# Patient Record
Sex: FEMALE | Race: WHITE | Employment: FULL TIME | URBAN - METROPOLITAN AREA
[De-identification: names, ages, dates, MRNs, and addresses within clinical notes are randomized per-mention and may not be internally consistent; named-entity substitution may affect disease eponyms.]

---

## 2023-08-10 ENCOUNTER — HOSPITAL ENCOUNTER (OUTPATIENT)
Dept: DATA CONVERSION | Facility: HOSPITAL | Age: 38
Discharge: HOME | End: 2023-08-10

## 2023-08-10 DIAGNOSIS — O09.522 SUPERVISION OF ELDERLY MULTIGRAVIDA, SECOND TRIMESTER (HHS-HCC): ICD-10-CM

## 2023-08-10 LAB
DEPRECATED RDW RBC AUTO: 44.9 FL (ref 37–54)
ERYTHROCYTE [DISTWIDTH] IN BLOOD BY AUTOMATED COUNT: 12.2 % (ref 11.7–15)
GLUCOSE 1H P MEAL SERPL-MCNC: 100 MG/DL (ref 70–139)
HCT VFR BLD AUTO: 33.3 % (ref 36–44)
HGB BLD-MCNC: 10.8 GM/DL (ref 12–15)
MCH RBC QN AUTO: 32.3 PG (ref 26–34)
MCHC RBC AUTO-ENTMCNC: 32.4 % (ref 31–37)
MCV RBC AUTO: 99.7 FL (ref 80–100)
NRBC BLD-RTO: 0 /100 WBC
PLATELET # BLD AUTO: 187 K/UL (ref 150–450)
PMV BLD AUTO: 13.2 CU (ref 7–12.6)
RBC # BLD AUTO: 3.34 M/UL (ref 4–4.9)
WBC # BLD AUTO: 7.9 K/UL (ref 4.5–11)

## 2023-09-20 PROBLEM — O36.5930: Status: ACTIVE | Noted: 2023-09-20

## 2023-09-20 PROBLEM — N92.6 IRREGULAR MENSTRUAL CYCLE: Status: ACTIVE | Noted: 2023-09-20

## 2023-09-20 PROBLEM — N97.8: Status: ACTIVE | Noted: 2023-09-20

## 2023-09-20 RX ORDER — MILK THISTLE 150 MG
CAPSULE ORAL
COMMUNITY
Start: 2019-08-12 | End: 2023-10-11 | Stop reason: ALTCHOICE

## 2023-09-20 RX ORDER — PNV NO.95/FERROUS FUM/FOLIC AC 28MG-0.8MG
1 TABLET ORAL
COMMUNITY
Start: 2019-08-12 | End: 2023-12-11 | Stop reason: ALTCHOICE

## 2023-09-20 RX ORDER — CHOLECALCIFEROL (VITAMIN D3) 25 MCG
TABLET ORAL
COMMUNITY
Start: 2019-08-12 | End: 2023-12-11 | Stop reason: ALTCHOICE

## 2023-09-20 RX ORDER — CITALOPRAM 40 MG/1
2 TABLET, FILM COATED ORAL
COMMUNITY

## 2023-09-20 RX ORDER — IBUPROFEN 200 MG
3 TABLET ORAL EVERY 6 HOURS PRN
COMMUNITY
End: 2023-10-11 | Stop reason: ALTCHOICE

## 2023-10-05 ENCOUNTER — ROUTINE PRENATAL (OUTPATIENT)
Dept: OBSTETRICS AND GYNECOLOGY | Facility: CLINIC | Age: 38
End: 2023-10-05
Payer: COMMERCIAL

## 2023-10-05 VITALS — SYSTOLIC BLOOD PRESSURE: 104 MMHG | BODY MASS INDEX: 29.76 KG/M2 | WEIGHT: 173.4 LBS | DIASTOLIC BLOOD PRESSURE: 62 MMHG

## 2023-10-05 DIAGNOSIS — O09.523 MULTIGRAVIDA OF ADVANCED MATERNAL AGE IN THIRD TRIMESTER (HHS-HCC): Primary | ICD-10-CM

## 2023-10-05 LAB
POC APPEARANCE, URINE: CLEAR
POC BILIRUBIN, URINE: NEGATIVE
POC BLOOD, URINE: NEGATIVE
POC COLOR, URINE: YELLOW
POC GLUCOSE, URINE: NEGATIVE MG/DL
POC KETONES, URINE: ABNORMAL MG/DL
POC LEUKOCYTES, URINE: NEGATIVE
POC NITRITE,URINE: NEGATIVE
POC PH, URINE: 7 PH
POC PROTEIN, URINE: NEGATIVE MG/DL
POC SPECIFIC GRAVITY, URINE: 1.02
POC UROBILINOGEN, URINE: 0.2 EU/DL

## 2023-10-05 PROCEDURE — 0501F PRENATAL FLOW SHEET: CPT | Performed by: OBSTETRICS & GYNECOLOGY

## 2023-10-05 PROCEDURE — 81003 URINALYSIS AUTO W/O SCOPE: CPT | Mod: QW | Performed by: OBSTETRICS & GYNECOLOGY

## 2023-10-05 PROCEDURE — 87081 CULTURE SCREEN ONLY: CPT | Performed by: OBSTETRICS & GYNECOLOGY

## 2023-10-05 PROCEDURE — 99213 OFFICE O/P EST LOW 20 MIN: CPT | Performed by: OBSTETRICS & GYNECOLOGY

## 2023-10-05 ASSESSMENT — PATIENT HEALTH QUESTIONNAIRE - PHQ9
2. FEELING DOWN, DEPRESSED OR HOPELESS: NOT AT ALL
SUM OF ALL RESPONSES TO PHQ9 QUESTIONS 1 & 2: 0
1. LITTLE INTEREST OR PLEASURE IN DOING THINGS: NOT AT ALL

## 2023-10-08 LAB — GP B STREP GENITAL QL CULT: NORMAL

## 2023-10-11 ENCOUNTER — ROUTINE PRENATAL (OUTPATIENT)
Dept: OBSTETRICS AND GYNECOLOGY | Facility: CLINIC | Age: 38
End: 2023-10-11
Payer: COMMERCIAL

## 2023-10-11 VITALS — SYSTOLIC BLOOD PRESSURE: 100 MMHG | BODY MASS INDEX: 30.28 KG/M2 | WEIGHT: 176.4 LBS | DIASTOLIC BLOOD PRESSURE: 62 MMHG

## 2023-10-11 DIAGNOSIS — O09.523 MULTIGRAVIDA OF ADVANCED MATERNAL AGE IN THIRD TRIMESTER (HHS-HCC): Primary | ICD-10-CM

## 2023-10-11 DIAGNOSIS — Z3A.36 36 WEEKS GESTATION OF PREGNANCY (HHS-HCC): ICD-10-CM

## 2023-10-11 LAB
POC APPEARANCE, URINE: CLEAR
POC BILIRUBIN, URINE: NEGATIVE
POC BLOOD, URINE: NEGATIVE
POC COLOR, URINE: YELLOW
POC GLUCOSE, URINE: NEGATIVE MG/DL
POC KETONES, URINE: NEGATIVE MG/DL
POC LEUKOCYTES, URINE: ABNORMAL
POC NITRITE,URINE: NEGATIVE
POC PH, URINE: 7 PH
POC PROTEIN, URINE: NEGATIVE MG/DL
POC SPECIFIC GRAVITY, URINE: 1.01
POC UROBILINOGEN, URINE: 0.2 EU/DL

## 2023-10-11 PROCEDURE — 99212 OFFICE O/P EST SF 10 MIN: CPT | Performed by: OBSTETRICS & GYNECOLOGY

## 2023-10-11 PROCEDURE — 81003 URINALYSIS AUTO W/O SCOPE: CPT | Mod: QW | Performed by: OBSTETRICS & GYNECOLOGY

## 2023-10-11 PROCEDURE — 0501F PRENATAL FLOW SHEET: CPT | Performed by: OBSTETRICS & GYNECOLOGY

## 2023-10-11 ASSESSMENT — PATIENT HEALTH QUESTIONNAIRE - PHQ9
1. LITTLE INTEREST OR PLEASURE IN DOING THINGS: NOT AT ALL
2. FEELING DOWN, DEPRESSED OR HOPELESS: NOT AT ALL
SUM OF ALL RESPONSES TO PHQ9 QUESTIONS 1 & 2: 0

## 2023-10-11 NOTE — PROGRESS NOTES
Subjective   Patient ID [id number]   Genna Garner is a 38 y.o.  at 36w4d with a working estimated date of delivery of 2023, by Last Menstrual Period who presents for a routine prenatal visit. She denies vaginal bleeding, leakage of fluid, decreased fetal movements, or contractions.    Her pregnancy is complicated by:  AMA    Objective   Physical Exam:   Weight: 80 kg (176 lb 6.4 oz)  Expected Total Weight Gain: Could not be calculated   Pregravid BMI: Could not be calculated  BP: 100/62  Fetal Heart Rate: reactive NST   Presentation: Vertex  Dilation: 2 Effacement (%): 80 Fetal Station: -1    Prenatal Labs  Urine Dip:  Lab Results   Component Value Date    KETONESU NEGATIVE 10/11/2023    PROTUR NEGATIVE 10/11/2023    GLUCOSEU NEGATIVE 10/11/2023    NITRITEU NEGATIVE 10/11/2023         Imaging  Last ultrasound good growth, vtx    Assessment/Plan   Diagnoses and all orders for this visit:  Multigravida of advanced maternal age in third trimester  -     POCT UA Automated manually resulted  -     Fetal nonstress test, once; Future  36 weeks gestation of pregnancy      Continue prenatal vitamin.  GBS neg  NST reactive  Labor precautions  Expected mode of delivery   Follow up in 1 week for a routine prenatal visit.  Maya Johnson MD

## 2023-10-19 ENCOUNTER — ROUTINE PRENATAL (OUTPATIENT)
Dept: OBSTETRICS AND GYNECOLOGY | Facility: CLINIC | Age: 38
End: 2023-10-19
Payer: COMMERCIAL

## 2023-10-19 VITALS — SYSTOLIC BLOOD PRESSURE: 106 MMHG | DIASTOLIC BLOOD PRESSURE: 66 MMHG | BODY MASS INDEX: 30.28 KG/M2 | WEIGHT: 176.4 LBS

## 2023-10-19 DIAGNOSIS — O09.523 MULTIGRAVIDA OF ADVANCED MATERNAL AGE IN THIRD TRIMESTER (HHS-HCC): Primary | ICD-10-CM

## 2023-10-19 DIAGNOSIS — Z3A.37 37 WEEKS GESTATION OF PREGNANCY (HHS-HCC): ICD-10-CM

## 2023-10-19 LAB
POC APPEARANCE, URINE: CLEAR
POC BILIRUBIN, URINE: NEGATIVE
POC BLOOD, URINE: NEGATIVE
POC COLOR, URINE: YELLOW
POC GLUCOSE, URINE: NEGATIVE MG/DL
POC KETONES, URINE: ABNORMAL MG/DL
POC LEUKOCYTES, URINE: ABNORMAL
POC NITRITE,URINE: NEGATIVE
POC PH, URINE: 6.5 PH
POC PROTEIN, URINE: NEGATIVE MG/DL
POC SPECIFIC GRAVITY, URINE: 1.02
POC UROBILINOGEN, URINE: 0.2 EU/DL

## 2023-10-19 PROCEDURE — 99212 OFFICE O/P EST SF 10 MIN: CPT | Performed by: OBSTETRICS & GYNECOLOGY

## 2023-10-19 PROCEDURE — 0501F PRENATAL FLOW SHEET: CPT | Performed by: OBSTETRICS & GYNECOLOGY

## 2023-10-19 PROCEDURE — 81003 URINALYSIS AUTO W/O SCOPE: CPT | Mod: QW | Performed by: OBSTETRICS & GYNECOLOGY

## 2023-10-19 ASSESSMENT — PATIENT HEALTH QUESTIONNAIRE - PHQ9
SUM OF ALL RESPONSES TO PHQ9 QUESTIONS 1 & 2: 0
2. FEELING DOWN, DEPRESSED OR HOPELESS: NOT AT ALL
1. LITTLE INTEREST OR PLEASURE IN DOING THINGS: NOT AT ALL

## 2023-10-19 NOTE — PROGRESS NOTES
Subjective   Patient ID [id number]   Genna Garner is a 38 y.o.  at 37w5d with a working estimated date of delivery of 2023, by Last Menstrual Period who presents for a routine prenatal visit. She denies vaginal bleeding, leakage of fluid, decreased fetal movements, or contractions.    Her pregnancy is complicated by:  AMA    Objective   Physical Exam:   Weight: 80 kg (176 lb 6.4 oz)  Expected Total Weight Gain: Could not be calculated   Pregravid BMI: Could not be calculated  BP: 106/66  Fetal Heart Rate: reactive NST Fundal Height (cm): 35 cm Presentation: Vertex  Dilation: 2 Effacement (%): 90 Fetal Station: 0    Prenatal Labs  Urine Dip  Results for orders placed or performed in visit on 10/19/23   POCT UA Automated manually resulted   Result Value Ref Range    POC Color, Urine Yellow Straw, Yellow, Light-Yellow    POC Appearance, Urine Clear Clear    POC Specific Gravity, Urine 1.025 1.005 - 1.035    POC PH, Urine 6.5 No Reference Range Established PH    POC Protein, Urine NEGATIVE NEGATIVE, 30 (1+) mg/dl    POC Glucose, Urine NEGATIVE NEGATIVE mg/dl    POC Blood, Urine NEGATIVE NEGATIVE    POC Ketones, Urine TRACE (A) NEGATIVE mg/dl    POC Bilirubin, Urine NEGATIVE NEGATIVE    POC Urobilinogen, Urine 0.2 0.2, 1.0 EU/DL    Poc Nitrate, Urine NEGATIVE NEGATIVE    POC Leukocytes, Urine SMALL (1+) (A) NEGATIVE         Imaging  Last ultrasound nl growth    Assessment/Plan   Diagnoses and all orders for this visit:  Multigravida of advanced maternal age in third trimester  -     POCT UA Automated manually resulted  -     Fetal nonstress test, once  37 weeks gestation of pregnancy      Continue prenatal vitamin.  GBS neg  Labor precautions  Induction sched for 10/30  Follow up in 1 week for a routine prenatal visit.    Maya Johnson MD

## 2023-10-24 NOTE — PROGRESS NOTES
Subjective   Patient ID [id number]   Genna Garner is a 38 y.o.  at 38w5d with a working estimated date of delivery of 2023, by Last Menstrual Period who presents for a routine prenatal visit. She denies vaginal bleeding, leakage of fluid, decreased fetal movements, or contractions.    Her pregnancy is complicated by:  AMA    Objective   Physical Exam:   Weight: 80.3 kg (177 lb)  Expected Total Weight Gain: Could not be calculated   Pregravid BMI: Could not be calculated  BP: 119/74  Fetal Heart Rate: reactive nst   Presentation: Vertex  Dilation: 2 Effacement (%): 90 Fetal Station: 0    Prenatal Labs  Urine Dip  Results for orders placed or performed in visit on 10/26/23   POCT UA Automated manually resulted   Result Value Ref Range    POC Color, Urine Yellow Straw, Yellow, Light-Yellow    POC Appearance, Urine Clear Clear    POC Specific Gravity, Urine 1.020 1.005 - 1.035    POC PH, Urine 7.0 No Reference Range Established PH    POC Protein, Urine NEGATIVE NEGATIVE, 30 (1+) mg/dl    POC Glucose, Urine NEGATIVE NEGATIVE mg/dl    POC Blood, Urine NEGATIVE NEGATIVE    POC Ketones, Urine NEGATIVE NEGATIVE mg/dl    POC Bilirubin, Urine NEGATIVE NEGATIVE    POC Urobilinogen, Urine 0.2 0.2, 1.0 EU/DL    Poc Nitrate, Urine NEGATIVE NEGATIVE    POC Leukocytes, Urine TRACE (A) NEGATIVE         Imaging      Assessment/Plan   Problem List Items Addressed This Visit    None  Visit Diagnoses         Codes    Supervision of other normal pregnancy, antepartum    -  Primary Z34.80    Relevant Orders    POCT UA Automated manually resulted (Completed)    38 weeks gestation of pregnancy     Z3A.38    Multigravida of advanced maternal age in third trimester     O09.523    Relevant Orders    Fetal nonstress test, once            Continue prenatal vitamin.  Labs reviewed.  GBS neg.  Expected mode of delivery IOL 10/29  Follow up pp.

## 2023-10-26 ENCOUNTER — ROUTINE PRENATAL (OUTPATIENT)
Dept: OBSTETRICS AND GYNECOLOGY | Facility: CLINIC | Age: 38
End: 2023-10-26
Payer: COMMERCIAL

## 2023-10-26 ENCOUNTER — APPOINTMENT (OUTPATIENT)
Dept: PRIMARY CARE | Facility: CLINIC | Age: 38
End: 2023-10-26
Payer: COMMERCIAL

## 2023-10-26 VITALS — SYSTOLIC BLOOD PRESSURE: 119 MMHG | BODY MASS INDEX: 30.38 KG/M2 | WEIGHT: 177 LBS | DIASTOLIC BLOOD PRESSURE: 74 MMHG

## 2023-10-26 DIAGNOSIS — Z34.80 SUPERVISION OF OTHER NORMAL PREGNANCY, ANTEPARTUM (HHS-HCC): Primary | ICD-10-CM

## 2023-10-26 DIAGNOSIS — Z3A.38 38 WEEKS GESTATION OF PREGNANCY (HHS-HCC): ICD-10-CM

## 2023-10-26 DIAGNOSIS — O09.523 MULTIGRAVIDA OF ADVANCED MATERNAL AGE IN THIRD TRIMESTER (HHS-HCC): ICD-10-CM

## 2023-10-26 LAB
POC APPEARANCE, URINE: CLEAR
POC BILIRUBIN, URINE: NEGATIVE
POC BLOOD, URINE: NEGATIVE
POC COLOR, URINE: YELLOW
POC GLUCOSE, URINE: NEGATIVE MG/DL
POC KETONES, URINE: NEGATIVE MG/DL
POC LEUKOCYTES, URINE: ABNORMAL
POC NITRITE,URINE: NEGATIVE
POC PH, URINE: 7 PH
POC PROTEIN, URINE: NEGATIVE MG/DL
POC SPECIFIC GRAVITY, URINE: 1.02
POC UROBILINOGEN, URINE: 0.2 EU/DL

## 2023-10-26 PROCEDURE — 99213 OFFICE O/P EST LOW 20 MIN: CPT | Performed by: OBSTETRICS & GYNECOLOGY

## 2023-10-26 PROCEDURE — 0501F PRENATAL FLOW SHEET: CPT | Performed by: OBSTETRICS & GYNECOLOGY

## 2023-10-26 PROCEDURE — 81003 URINALYSIS AUTO W/O SCOPE: CPT | Mod: QW | Performed by: OBSTETRICS & GYNECOLOGY

## 2023-10-26 ASSESSMENT — LIFESTYLE VARIABLES
AUDIT-C TOTAL SCORE: 0
SKIP TO QUESTIONS 9-10: 1
HOW OFTEN DO YOU HAVE A DRINK CONTAINING ALCOHOL: NEVER
HOW MANY STANDARD DRINKS CONTAINING ALCOHOL DO YOU HAVE ON A TYPICAL DAY: 1 OR 2
HOW OFTEN DO YOU HAVE SIX OR MORE DRINKS ON ONE OCCASION: NEVER

## 2023-10-29 ENCOUNTER — APPOINTMENT (OUTPATIENT)
Dept: OBSTETRICS AND GYNECOLOGY | Facility: HOSPITAL | Age: 38
End: 2023-10-29
Payer: COMMERCIAL

## 2023-10-30 ENCOUNTER — ANESTHESIA (OUTPATIENT)
Dept: OBSTETRICS AND GYNECOLOGY | Facility: HOSPITAL | Age: 38
End: 2023-10-30
Payer: COMMERCIAL

## 2023-10-30 ENCOUNTER — APPOINTMENT (OUTPATIENT)
Dept: OBSTETRICS AND GYNECOLOGY | Facility: HOSPITAL | Age: 38
End: 2023-10-30
Payer: COMMERCIAL

## 2023-10-30 ENCOUNTER — HOSPITAL ENCOUNTER (INPATIENT)
Facility: HOSPITAL | Age: 38
LOS: 1 days | Discharge: ANOTHER HOSPITAL | End: 2023-10-31
Attending: OBSTETRICS & GYNECOLOGY | Admitting: OBSTETRICS & GYNECOLOGY
Payer: COMMERCIAL

## 2023-10-30 DIAGNOSIS — O09.523 MULTIGRAVIDA OF ADVANCED MATERNAL AGE IN THIRD TRIMESTER (HHS-HCC): ICD-10-CM

## 2023-10-30 DIAGNOSIS — Z3A.37 37 WEEKS GESTATION OF PREGNANCY (HHS-HCC): ICD-10-CM

## 2023-10-30 LAB
ABO GROUP (TYPE) IN BLOOD: NORMAL
ANTIBODY SCREEN: NORMAL
ERYTHROCYTE [DISTWIDTH] IN BLOOD BY AUTOMATED COUNT: 13.3 % (ref 11.5–14.5)
HCT VFR BLD AUTO: 32.1 % (ref 36–46)
HGB BLD-MCNC: 11 G/DL (ref 12–16)
MCH RBC QN AUTO: 32.2 PG (ref 26–34)
MCHC RBC AUTO-ENTMCNC: 34.3 G/DL (ref 32–36)
MCV RBC AUTO: 94 FL (ref 80–100)
NRBC BLD-RTO: 0 /100 WBCS (ref 0–0)
PLATELET # BLD AUTO: 140 X10*3/UL (ref 150–450)
PMV BLD AUTO: 13 FL (ref 7.5–11.5)
RBC # BLD AUTO: 3.42 X10*6/UL (ref 4–5.2)
RH FACTOR (ANTIGEN D): NORMAL
WBC # BLD AUTO: 9.5 X10*3/UL (ref 4.4–11.3)

## 2023-10-30 PROCEDURE — 1120000001 HC OB PRIVATE ROOM DAILY

## 2023-10-30 PROCEDURE — 86780 TREPONEMA PALLIDUM: CPT | Mod: TRILAB | Performed by: OBSTETRICS & GYNECOLOGY

## 2023-10-30 PROCEDURE — 85027 COMPLETE CBC AUTOMATED: CPT | Performed by: OBSTETRICS & GYNECOLOGY

## 2023-10-30 PROCEDURE — 2500000004 HC RX 250 GENERAL PHARMACY W/ HCPCS (ALT 636 FOR OP/ED): Performed by: OBSTETRICS & GYNECOLOGY

## 2023-10-30 PROCEDURE — 86901 BLOOD TYPING SEROLOGIC RH(D): CPT | Performed by: OBSTETRICS & GYNECOLOGY

## 2023-10-30 PROCEDURE — 36415 COLL VENOUS BLD VENIPUNCTURE: CPT | Performed by: OBSTETRICS & GYNECOLOGY

## 2023-10-30 RX ORDER — SODIUM CHLORIDE, SODIUM LACTATE, POTASSIUM CHLORIDE, CALCIUM CHLORIDE 600; 310; 30; 20 MG/100ML; MG/100ML; MG/100ML; MG/100ML
125 INJECTION, SOLUTION INTRAVENOUS CONTINUOUS
Status: DISCONTINUED | OUTPATIENT
Start: 2023-10-30 | End: 2023-10-31 | Stop reason: HOSPADM

## 2023-10-30 RX ORDER — OXYTOCIN/0.9 % SODIUM CHLORIDE 30/500 ML
2-30 PLASTIC BAG, INJECTION (ML) INTRAVENOUS CONTINUOUS
Status: DISCONTINUED | OUTPATIENT
Start: 2023-10-30 | End: 2023-10-31 | Stop reason: HOSPADM

## 2023-10-30 RX ORDER — HYDRALAZINE HYDROCHLORIDE 20 MG/ML
5 INJECTION INTRAMUSCULAR; INTRAVENOUS ONCE AS NEEDED
Status: DISCONTINUED | OUTPATIENT
Start: 2023-10-30 | End: 2023-10-31 | Stop reason: HOSPADM

## 2023-10-30 RX ORDER — CARBOPROST TROMETHAMINE 250 UG/ML
250 INJECTION, SOLUTION INTRAMUSCULAR ONCE AS NEEDED
Status: DISCONTINUED | OUTPATIENT
Start: 2023-10-30 | End: 2023-10-31 | Stop reason: HOSPADM

## 2023-10-30 RX ORDER — METHYLERGONOVINE MALEATE 0.2 MG/ML
0.2 INJECTION INTRAVENOUS ONCE AS NEEDED
Status: DISCONTINUED | OUTPATIENT
Start: 2023-10-30 | End: 2023-10-31 | Stop reason: HOSPADM

## 2023-10-30 RX ORDER — NIFEDIPINE 10 MG/1
10 CAPSULE ORAL ONCE AS NEEDED
Status: DISCONTINUED | OUTPATIENT
Start: 2023-10-30 | End: 2023-10-31 | Stop reason: HOSPADM

## 2023-10-30 RX ORDER — LOPERAMIDE HYDROCHLORIDE 2 MG/1
4 CAPSULE ORAL EVERY 2 HOUR PRN
Status: DISCONTINUED | OUTPATIENT
Start: 2023-10-30 | End: 2023-10-31 | Stop reason: HOSPADM

## 2023-10-30 RX ORDER — TRANEXAMIC ACID 100 MG/ML
1000 INJECTION, SOLUTION INTRAVENOUS ONCE AS NEEDED
Status: DISCONTINUED | OUTPATIENT
Start: 2023-10-30 | End: 2023-10-31 | Stop reason: HOSPADM

## 2023-10-30 RX ORDER — MISOPROSTOL 200 UG/1
800 TABLET ORAL ONCE AS NEEDED
Status: DISCONTINUED | OUTPATIENT
Start: 2023-10-30 | End: 2023-10-31 | Stop reason: HOSPADM

## 2023-10-30 RX ORDER — ONDANSETRON HYDROCHLORIDE 2 MG/ML
4 INJECTION, SOLUTION INTRAVENOUS EVERY 6 HOURS PRN
Status: DISCONTINUED | OUTPATIENT
Start: 2023-10-30 | End: 2023-10-31 | Stop reason: HOSPADM

## 2023-10-30 RX ORDER — MISOPROSTOL 100 UG/1
25 TABLET ORAL
Status: DISCONTINUED | OUTPATIENT
Start: 2023-10-30 | End: 2023-10-30

## 2023-10-30 RX ORDER — TERBUTALINE SULFATE 1 MG/ML
0.25 INJECTION SUBCUTANEOUS ONCE AS NEEDED
Status: DISCONTINUED | OUTPATIENT
Start: 2023-10-30 | End: 2023-10-31 | Stop reason: HOSPADM

## 2023-10-30 RX ORDER — LIDOCAINE HYDROCHLORIDE 10 MG/ML
30 INJECTION INFILTRATION; PERINEURAL ONCE AS NEEDED
Status: DISCONTINUED | OUTPATIENT
Start: 2023-10-30 | End: 2023-10-31 | Stop reason: HOSPADM

## 2023-10-30 RX ORDER — METOCLOPRAMIDE 10 MG/1
10 TABLET ORAL EVERY 6 HOURS PRN
Status: DISCONTINUED | OUTPATIENT
Start: 2023-10-30 | End: 2023-10-31 | Stop reason: HOSPADM

## 2023-10-30 RX ORDER — OXYTOCIN 10 [USP'U]/ML
10 INJECTION, SOLUTION INTRAMUSCULAR; INTRAVENOUS ONCE AS NEEDED
Status: DISCONTINUED | OUTPATIENT
Start: 2023-10-30 | End: 2023-10-31 | Stop reason: HOSPADM

## 2023-10-30 RX ORDER — ONDANSETRON 4 MG/1
4 TABLET, FILM COATED ORAL EVERY 6 HOURS PRN
Status: DISCONTINUED | OUTPATIENT
Start: 2023-10-30 | End: 2023-10-31 | Stop reason: HOSPADM

## 2023-10-30 RX ORDER — METOCLOPRAMIDE HYDROCHLORIDE 5 MG/ML
10 INJECTION INTRAMUSCULAR; INTRAVENOUS EVERY 6 HOURS PRN
Status: DISCONTINUED | OUTPATIENT
Start: 2023-10-30 | End: 2023-10-31 | Stop reason: HOSPADM

## 2023-10-30 RX ORDER — LABETALOL HYDROCHLORIDE 5 MG/ML
20 INJECTION, SOLUTION INTRAVENOUS ONCE AS NEEDED
Status: DISCONTINUED | OUTPATIENT
Start: 2023-10-30 | End: 2023-10-31 | Stop reason: HOSPADM

## 2023-10-30 RX ORDER — OXYTOCIN/0.9 % SODIUM CHLORIDE 30/500 ML
60 PLASTIC BAG, INJECTION (ML) INTRAVENOUS ONCE AS NEEDED
Status: DISCONTINUED | OUTPATIENT
Start: 2023-10-30 | End: 2023-10-31 | Stop reason: HOSPADM

## 2023-10-30 RX ADMIN — SODIUM CHLORIDE, SODIUM LACTATE, POTASSIUM CHLORIDE, AND CALCIUM CHLORIDE 125 ML/HR: 600; 310; 30; 20 INJECTION, SOLUTION INTRAVENOUS at 22:00

## 2023-10-30 RX ADMIN — Medication 2 MILLI-UNITS/MIN: at 21:44

## 2023-10-30 RX ADMIN — SODIUM CHLORIDE, SODIUM LACTATE, POTASSIUM CHLORIDE, AND CALCIUM CHLORIDE 500 ML: 600; 310; 30; 20 INJECTION, SOLUTION INTRAVENOUS at 21:00

## 2023-10-30 SDOH — HEALTH STABILITY: MENTAL HEALTH
STRESS IS WHEN SOMEONE FEELS TENSE, NERVOUS, ANXIOUS, OR CAN'T SLEEP AT NIGHT BECAUSE THEIR MIND IS TROUBLED. HOW STRESSED ARE YOU?: NOT AT ALL

## 2023-10-30 SDOH — HEALTH STABILITY: MENTAL HEALTH: HOW OFTEN DO YOU HAVE 6 OR MORE DRINKS ON ONE OCCASION?: NEVER

## 2023-10-30 SDOH — SOCIAL STABILITY: SOCIAL NETWORK: HOW OFTEN DO YOU ATTENT MEETINGS OF THE CLUB OR ORGANIZATION YOU BELONG TO?: NEVER

## 2023-10-30 SDOH — HEALTH STABILITY: MENTAL HEALTH: SUICIDAL BEHAVIOR (LIFETIME): NO

## 2023-10-30 SDOH — SOCIAL STABILITY: SOCIAL NETWORK
DO YOU BELONG TO ANY CLUBS OR ORGANIZATIONS SUCH AS CHURCH GROUPS UNIONS, FRATERNAL OR ATHLETIC GROUPS, OR SCHOOL GROUPS?: NO

## 2023-10-30 SDOH — HEALTH STABILITY: MENTAL HEALTH: HOW OFTEN DO YOU HAVE A DRINK CONTAINING ALCOHOL?: NEVER

## 2023-10-30 SDOH — SOCIAL STABILITY: SOCIAL INSECURITY: WITHIN THE LAST YEAR, HAVE YOU BEEN HUMILIATED OR EMOTIONALLY ABUSED IN OTHER WAYS BY YOUR PARTNER OR EX-PARTNER?: NO

## 2023-10-30 SDOH — HEALTH STABILITY: PHYSICAL HEALTH: ON AVERAGE, HOW MANY DAYS PER WEEK DO YOU ENGAGE IN MODERATE TO STRENUOUS EXERCISE (LIKE A BRISK WALK)?: 3 DAYS

## 2023-10-30 SDOH — SOCIAL STABILITY: SOCIAL INSECURITY: ABUSE SCREEN: ADULT

## 2023-10-30 SDOH — SOCIAL STABILITY: SOCIAL NETWORK
IN A TYPICAL WEEK, HOW MANY TIMES DO YOU TALK ON THE PHONE WITH FAMILY, FRIENDS, OR NEIGHBORS?: MORE THAN THREE TIMES A WEEK

## 2023-10-30 SDOH — SOCIAL STABILITY: SOCIAL INSECURITY: ARE THERE ANY APPARENT SIGNS OF INJURIES/BEHAVIORS THAT COULD BE RELATED TO ABUSE/NEGLECT?: YES

## 2023-10-30 SDOH — HEALTH STABILITY: MENTAL HEALTH: STRENGTHS (MUST CHOOSE TWO): SUPPORT FROM FAMILY;SUPPORT FROM FRIENDS

## 2023-10-30 SDOH — SOCIAL STABILITY: SOCIAL INSECURITY
WITHIN THE LAST YEAR, HAVE YOU BEEN KICKED, HIT, SLAPPED, OR OTHERWISE PHYSICALLY HURT BY YOUR PARTNER OR EX-PARTNER?: NO

## 2023-10-30 SDOH — ECONOMIC STABILITY: TRANSPORTATION INSECURITY
IN THE PAST 12 MONTHS, HAS THE LACK OF TRANSPORTATION KEPT YOU FROM MEDICAL APPOINTMENTS OR FROM GETTING MEDICATIONS?: NO

## 2023-10-30 SDOH — SOCIAL STABILITY: SOCIAL INSECURITY: PHYSICAL ABUSE: DENIES

## 2023-10-30 SDOH — SOCIAL STABILITY: SOCIAL INSECURITY: WITHIN THE LAST YEAR, HAVE YOU BEEN AFRAID OF YOUR PARTNER OR EX-PARTNER?: NO

## 2023-10-30 SDOH — HEALTH STABILITY: PHYSICAL HEALTH: ON AVERAGE, HOW MANY MINUTES DO YOU ENGAGE IN EXERCISE AT THIS LEVEL?: 60 MIN

## 2023-10-30 SDOH — ECONOMIC STABILITY: FOOD INSECURITY: WITHIN THE PAST 12 MONTHS, THE FOOD YOU BOUGHT JUST DIDN'T LAST AND YOU DIDN'T HAVE MONEY TO GET MORE.: NEVER TRUE

## 2023-10-30 SDOH — HEALTH STABILITY: MENTAL HEALTH: NON-SPECIFIC ACTIVE SUICIDAL THOUGHTS (PAST 1 MONTH): NO

## 2023-10-30 SDOH — ECONOMIC STABILITY: HOUSING INSECURITY: DO YOU FEEL UNSAFE GOING BACK TO THE PLACE WHERE YOU ARE LIVING?: YES

## 2023-10-30 SDOH — HEALTH STABILITY: MENTAL HEALTH: WERE YOU ABLE TO COMPLETE ALL THE BEHAVIORAL HEALTH SCREENINGS?: YES

## 2023-10-30 SDOH — SOCIAL STABILITY: SOCIAL INSECURITY: VERBAL ABUSE: DENIES

## 2023-10-30 SDOH — SOCIAL STABILITY: SOCIAL INSECURITY
WITHIN THE LAST YEAR, HAVE TO BEEN RAPED OR FORCED TO HAVE ANY KIND OF SEXUAL ACTIVITY BY YOUR PARTNER OR EX-PARTNER?: NO

## 2023-10-30 SDOH — HEALTH STABILITY: MENTAL HEALTH: HOW MANY STANDARD DRINKS CONTAINING ALCOHOL DO YOU HAVE ON A TYPICAL DAY?: PATIENT DOES NOT DRINK

## 2023-10-30 SDOH — SOCIAL STABILITY: SOCIAL NETWORK: ARE YOU MARRIED, WIDOWED, DIVORCED, SEPARATED, NEVER MARRIED, OR LIVING WITH A PARTNER?: MARRIED

## 2023-10-30 SDOH — HEALTH STABILITY: MENTAL HEALTH: HAVE YOU USED ANY PRESCRIPTION DRUGS OTHER THAN PRESCRIBED IN THE PAST 12 MONTHS?: NO

## 2023-10-30 SDOH — ECONOMIC STABILITY: INCOME INSECURITY: HOW HARD IS IT FOR YOU TO PAY FOR THE VERY BASICS LIKE FOOD, HOUSING, MEDICAL CARE, AND HEATING?: NOT HARD AT ALL

## 2023-10-30 SDOH — SOCIAL STABILITY: SOCIAL INSECURITY: DOES ANYONE TRY TO KEEP YOU FROM HAVING/CONTACTING OTHER FRIENDS OR DOING THINGS OUTSIDE YOUR HOME?: YES

## 2023-10-30 SDOH — ECONOMIC STABILITY: FOOD INSECURITY: WITHIN THE PAST 12 MONTHS, YOU WORRIED THAT YOUR FOOD WOULD RUN OUT BEFORE YOU GOT MONEY TO BUY MORE.: NEVER TRUE

## 2023-10-30 SDOH — HEALTH STABILITY: MENTAL HEALTH: HAVE YOU USED ANY SUBSTANCES (CANABIS, COCAINE, HEROIN, HALLUCINOGENS, INHALANTS, ETC.) IN THE PAST 12 MONTHS?: NO

## 2023-10-30 SDOH — ECONOMIC STABILITY: TRANSPORTATION INSECURITY
IN THE PAST 12 MONTHS, HAS LACK OF TRANSPORTATION KEPT YOU FROM MEETINGS, WORK, OR FROM GETTING THINGS NEEDED FOR DAILY LIVING?: NO

## 2023-10-30 SDOH — SOCIAL STABILITY: SOCIAL INSECURITY: HAS ANYONE EVER THREATENED TO HURT YOUR FAMILY OR YOUR PETS?: YES

## 2023-10-30 SDOH — HEALTH STABILITY: MENTAL HEALTH: WISH TO BE DEAD (PAST 1 MONTH): NO

## 2023-10-30 SDOH — SOCIAL STABILITY: SOCIAL INSECURITY: DO YOU FEEL ANYONE HAS EXPLOITED OR TAKEN ADVANTAGE OF YOU FINANCIALLY OR OF YOUR PERSONAL PROPERTY?: YES

## 2023-10-30 SDOH — SOCIAL STABILITY: SOCIAL INSECURITY: ARE YOU OR HAVE YOU BEEN THREATENED OR ABUSED PHYSICALLY, EMOTIONALLY, OR SEXUALLY BY ANYONE?: YES

## 2023-10-30 SDOH — SOCIAL STABILITY: SOCIAL NETWORK: HOW OFTEN DO YOU GET TOGETHER WITH FRIENDS OR RELATIVES?: MORE THAN THREE TIMES A WEEK

## 2023-10-30 SDOH — SOCIAL STABILITY: SOCIAL NETWORK: HOW OFTEN DO YOU ATTEND CHURCH OR RELIGIOUS SERVICES?: NEVER

## 2023-10-30 SDOH — SOCIAL STABILITY: SOCIAL INSECURITY: HAVE YOU HAD THOUGHTS OF HARMING ANYONE ELSE?: NO

## 2023-10-30 ASSESSMENT — LIFESTYLE VARIABLES
HOW OFTEN DO YOU HAVE 6 OR MORE DRINKS ON ONE OCCASION: NEVER
AUDIT-C TOTAL SCORE: 0
AUDIT-C TOTAL SCORE: 0
HOW OFTEN DO YOU HAVE A DRINK CONTAINING ALCOHOL: NEVER
HOW MANY STANDARD DRINKS CONTAINING ALCOHOL DO YOU HAVE ON A TYPICAL DAY: PATIENT DOES NOT DRINK
AUDIT-C TOTAL SCORE: 0
SKIP TO QUESTIONS 9-10: 1
SKIP TO QUESTIONS 9-10: 1

## 2023-10-30 ASSESSMENT — ACTIVITIES OF DAILY LIVING (ADL)
TOILETING: INDEPENDENT
LACK_OF_TRANSPORTATION: NO
ADEQUATE_TO_COMPLETE_ADL: YES
PATIENT'S MEMORY ADEQUATE TO SAFELY COMPLETE DAILY ACTIVITIES?: YES
WALKS IN HOME: INDEPENDENT
DRESSING YOURSELF: INDEPENDENT
JUDGMENT_ADEQUATE_SAFELY_COMPLETE_DAILY_ACTIVITIES: YES
HEARING - LEFT EAR: FUNCTIONAL
FEEDING YOURSELF: INDEPENDENT
BATHING: INDEPENDENT
HEARING - RIGHT EAR: FUNCTIONAL
GROOMING: INDEPENDENT

## 2023-10-30 ASSESSMENT — SOCIAL DETERMINANTS OF HEALTH (SDOH)
HOW OFTEN DO YOU ATTENT MEETINGS OF THE CLUB OR ORGANIZATION YOU BELONG TO?: NEVER
HOW OFTEN DO YOU ATTENT MEETINGS OF THE CLUB OR ORGANIZATION YOU BELONG TO?: NEVER
HOW OFTEN DO YOU GET TOGETHER WITH FRIENDS OR RELATIVES?: MORE THAN THREE TIMES A WEEK
DO YOU BELONG TO ANY CLUBS OR ORGANIZATIONS SUCH AS CHURCH GROUPS UNIONS, FRATERNAL OR ATHLETIC GROUPS, OR SCHOOL GROUPS?: NO
HOW OFTEN DO YOU ATTEND CHURCH OR RELIGIOUS SERVICES?: NEVER
IN A TYPICAL WEEK, HOW MANY TIMES DO YOU TALK ON THE PHONE WITH FAMILY, FRIENDS, OR NEIGHBORS?: MORE THAN THREE TIMES A WEEK
DO YOU BELONG TO ANY CLUBS OR ORGANIZATIONS SUCH AS CHURCH GROUPS UNIONS, FRATERNAL OR ATHLETIC GROUPS, OR SCHOOL GROUPS?: NO
HOW OFTEN DO YOU ATTEND CHURCH OR RELIGIOUS SERVICES?: NEVER
HOW OFTEN DO YOU GET TOGETHER WITH FRIENDS OR RELATIVES?: MORE THAN THREE TIMES A WEEK
IN A TYPICAL WEEK, HOW MANY TIMES DO YOU TALK ON THE PHONE WITH FAMILY, FRIENDS, OR NEIGHBORS?: MORE THAN THREE TIMES A WEEK

## 2023-10-30 ASSESSMENT — PATIENT HEALTH QUESTIONNAIRE - PHQ9
1. LITTLE INTEREST OR PLEASURE IN DOING THINGS: NOT AT ALL
SUM OF ALL RESPONSES TO PHQ9 QUESTIONS 1 & 2: 0
2. FEELING DOWN, DEPRESSED OR HOPELESS: NOT AT ALL

## 2023-10-30 ASSESSMENT — PAIN SCALES - GENERAL
PAINLEVEL_OUTOF10: 0 - NO PAIN

## 2023-10-30 NOTE — H&P
Obstetrical Admission History and Physical     Genna Garner is a 38 y.o.  at 39w2d. CHITRA: 2023, by Last Menstrual Period. . She has had prenatal care with Dr. Johnson .    Chief Complaint: No chief complaint on file.    Assessment/Plan    Term induction.    Principal Problem:    AMA (advanced maternal age) multigravida 35+, third trimester      Pregnancy Problems (from 23 to present)       No problems associated with this episode.          Options for delivery have been discussed with the patient and she elects for an induction of labor.  Cervical ripening with cytotec, cervidil, other prostaglandin agents has been discussed.  Induction of labor with pitocin, amniotomy, cytotec, and cervical balloon have been discussed in detail. The risks, benefits, complications, alternatives, expected outcomes, potential problems during recuperation and recovery, and the risks of not performing the procedure were discussed with the patient. The patient stated understanding that the risks of delivery include, but are not limited to: death; reaction to medications; injury to bowel, bladder, ureters, uterus, cervix, vagina, and other pelvic and abdominal structures, infection; blood loss and possible need for transfusion; and potential need for surgery, including hysterectomy. The risks of injury to the infant during delivery were also discussed. All questions were answered. There was concurrence with the planned procedure, and the patient wanted to proceed.    Admit to inpatient status. I anticipate that this patient will require a stay exceeding at least 2 midnights for delivery and postpartum.  Induction of labor.  Management of pregnancy complications, as indicated.    Subjective   Good fetal movement. Denies vaginal bleeding.     Reason for Induction of Labor:  Pregnancy at 39 weeks or greater for induction         Obstetrical History   OB History    Para Term  AB Living   3 1 1   1 1   SAB IAB  Ectopic Multiple Live Births   1       1      # Outcome Date GA Lbr Bishop/2nd Weight Sex Delivery Anes PTL Lv   3 Current            2 SAB 08/16/22           1 Term 09/01/20   2296 g M Vag-Spont  N JIM       Past Medical History  Past Medical History:   Diagnosis Date    Allergy status to unspecified drugs, medicaments and biological substances     History of seasonal allergies        Past Surgical History   Past Surgical History:   Procedure Laterality Date    OTHER SURGICAL HISTORY  12/16/2019    No history of surgery       Social History  Social History     Tobacco Use    Smoking status: Never    Smokeless tobacco: Never   Substance Use Topics    Alcohol use: Not Currently     Substance and Sexual Activity   Drug Use Never       Allergies  Bee pollen     Medications  Medications Prior to Admission   Medication Sig Dispense Refill Last Dose    cholecalciferol (Vitamin D-3) 25 MCG (1000 UT) tablet Take by mouth.       citalopram (CeleXA) 40 mg tablet Take 2 tablets (80 mg) by mouth once daily.       prenatal vitamin, iron-folic, (Prenatal) 27 mg iron-800 mcg folic acid tablet Take 1 tablet by mouth.          Objective    Last Vitals  Temp Pulse Resp BP MAP O2 Sat                   Physical Examination  Normal  2/90/0 in office    Lab Review  Labs in chart were reviewed.

## 2023-10-31 ENCOUNTER — ANESTHESIA EVENT (OUTPATIENT)
Dept: OBSTETRICS AND GYNECOLOGY | Facility: HOSPITAL | Age: 38
End: 2023-10-31
Payer: COMMERCIAL

## 2023-10-31 ENCOUNTER — HOSPITAL ENCOUNTER (INPATIENT)
Facility: HOSPITAL | Age: 38
LOS: 1 days | Discharge: HOME | End: 2023-11-01
Attending: STUDENT IN AN ORGANIZED HEALTH CARE EDUCATION/TRAINING PROGRAM | Admitting: STUDENT IN AN ORGANIZED HEALTH CARE EDUCATION/TRAINING PROGRAM
Payer: COMMERCIAL

## 2023-10-31 ENCOUNTER — APPOINTMENT (OUTPATIENT)
Dept: RADIOLOGY | Facility: HOSPITAL | Age: 38
End: 2023-10-31
Payer: COMMERCIAL

## 2023-10-31 ENCOUNTER — ANESTHESIA (OUTPATIENT)
Dept: OBSTETRICS AND GYNECOLOGY | Facility: HOSPITAL | Age: 38
End: 2023-10-31
Payer: COMMERCIAL

## 2023-10-31 ENCOUNTER — LAB REQUISITION (OUTPATIENT)
Dept: LAB | Facility: HOSPITAL | Age: 38
End: 2023-10-31
Payer: COMMERCIAL

## 2023-10-31 VITALS
WEIGHT: 177 LBS | HEIGHT: 64 IN | HEART RATE: 91 BPM | RESPIRATION RATE: 18 BRPM | DIASTOLIC BLOOD PRESSURE: 52 MMHG | BODY MASS INDEX: 30.22 KG/M2 | TEMPERATURE: 97.5 F | OXYGEN SATURATION: 96 % | SYSTOLIC BLOOD PRESSURE: 100 MMHG

## 2023-10-31 PROBLEM — F32.A DEPRESSION: Status: ACTIVE | Noted: 2023-10-31

## 2023-10-31 PROBLEM — F41.9 ANXIETY: Status: ACTIVE | Noted: 2023-10-31

## 2023-10-31 LAB
ABO GROUP (TYPE) IN BLOOD: NORMAL
ALBUMIN SERPL BCP-MCNC: 2.3 G/DL (ref 3.4–5)
ALBUMIN SERPL-MCNC: 3.1 G/DL (ref 3.5–5)
ALP BLD-CCNC: 104 U/L (ref 35–125)
ALP SERPL-CCNC: 79 U/L (ref 33–110)
ALT SERPL W P-5'-P-CCNC: 11 U/L (ref 7–45)
ALT SERPL-CCNC: 13 U/L (ref 5–40)
AMPHETAMINES UR QL SCN>1000 NG/ML: NEGATIVE
ANION GAP SERPL CALC-SCNC: 10 MMOL/L
ANION GAP SERPL CALC-SCNC: 14 MMOL/L (ref 10–20)
ANTIBODY SCREEN: NORMAL
APTT PPP: 24.2 SECONDS (ref 22–32.5)
AST SERPL W P-5'-P-CCNC: 28 U/L (ref 9–39)
AST SERPL-CCNC: 31 U/L (ref 5–40)
BARBITURATES UR QL SCN>300 NG/ML: NEGATIVE
BASE EXCESS BLDCOA CALC-SCNC: -7.7 MMOL/L (ref -10.8–-0.5)
BASE EXCESS BLDCOV CALC-SCNC: -8.1 MMOL/L (ref -8.1–-0.5)
BASOPHILS # BLD AUTO: 0.02 X10*3/UL (ref 0–0.1)
BASOPHILS # BLD AUTO: 0.04 X10*3/UL (ref 0–0.1)
BASOPHILS NFR BLD AUTO: 0.1 %
BASOPHILS NFR BLD AUTO: 0.2 %
BENZODIAZ UR QL SCN>300 NG/ML: POSITIVE
BILIRUB SERPL-MCNC: 0.3 MG/DL (ref 0.1–1.2)
BILIRUB SERPL-MCNC: 0.3 MG/DL (ref 0–1.2)
BODY TEMPERATURE: 37 DEGREES CELSIUS
BODY TEMPERATURE: 37 DEGREES CELSIUS
BUN SERPL-MCNC: 5 MG/DL (ref 6–23)
BUN SERPL-MCNC: 7 MG/DL (ref 8–25)
BZE UR QL SCN>300 NG/ML: NEGATIVE
CALCIUM SERPL-MCNC: 8.5 MG/DL (ref 8.6–10.6)
CALCIUM SERPL-MCNC: 8.7 MG/DL (ref 8.5–10.4)
CANNABINOIDS UR QL SCN>50 NG/ML: NEGATIVE
CHLORIDE SERPL-SCNC: 103 MMOL/L (ref 97–107)
CHLORIDE SERPL-SCNC: 98 MMOL/L (ref 98–107)
CO2 SERPL-SCNC: 18 MMOL/L (ref 21–32)
CO2 SERPL-SCNC: 20 MMOL/L (ref 24–31)
CREAT SERPL-MCNC: 0.45 MG/DL (ref 0.5–1.05)
CREAT SERPL-MCNC: 0.6 MG/DL (ref 0.4–1.6)
EOSINOPHIL # BLD AUTO: 0 X10*3/UL (ref 0–0.7)
EOSINOPHIL # BLD AUTO: 0.01 X10*3/UL (ref 0–0.7)
EOSINOPHIL NFR BLD AUTO: 0 %
EOSINOPHIL NFR BLD AUTO: 0.1 %
ERYTHROCYTE [DISTWIDTH] IN BLOOD BY AUTOMATED COUNT: 13.3 % (ref 11.5–14.5)
ERYTHROCYTE [DISTWIDTH] IN BLOOD BY AUTOMATED COUNT: 13.4 % (ref 11.5–14.5)
FENTANYL+NORFENTANYL UR QL SCN: POSITIVE
FIBRINOGEN PPP-MCNC: 418 MG/DL (ref 200–400)
GFR SERPL CREATININE-BSD FRML MDRD: >90 ML/MIN/1.73M*2
GFR SERPL CREATININE-BSD FRML MDRD: >90 ML/MIN/1.73M*2
GLUCOSE SERPL-MCNC: 87 MG/DL (ref 74–99)
GLUCOSE SERPL-MCNC: 91 MG/DL (ref 65–99)
HCO3 BLDCOA-SCNC: 23.7 MMOL/L (ref 15–29)
HCO3 BLDCOV-SCNC: 22.4 MMOL/L (ref 16–26)
HCT VFR BLD AUTO: 24.4 % (ref 36–46)
HCT VFR BLD AUTO: 32.5 % (ref 36–46)
HGB BLD-MCNC: 10.6 G/DL (ref 12–16)
HGB BLD-MCNC: 7.6 G/DL (ref 12–16)
IMM GRANULOCYTES # BLD AUTO: 0.06 X10*3/UL (ref 0–0.7)
IMM GRANULOCYTES # BLD AUTO: 0.06 X10*3/UL (ref 0–0.7)
IMM GRANULOCYTES NFR BLD AUTO: 0.3 % (ref 0–0.9)
IMM GRANULOCYTES NFR BLD AUTO: 0.4 % (ref 0–0.9)
INHALED O2 CONCENTRATION: 21 %
INHALED O2 CONCENTRATION: 21 %
INR PPP: 0.9 (ref 0.9–1.2)
LDH SERPL L TO P-CCNC: 183 U/L (ref 84–246)
LDH SERPL-CCNC: 243 U/L (ref 91–227)
LYMPHOCYTES # BLD AUTO: 0.77 X10*3/UL (ref 1.2–4.8)
LYMPHOCYTES # BLD AUTO: 1.09 X10*3/UL (ref 1.2–4.8)
LYMPHOCYTES NFR BLD AUTO: 5.5 %
LYMPHOCYTES NFR BLD AUTO: 6.2 %
MCH RBC QN AUTO: 31.5 PG (ref 26–34)
MCH RBC QN AUTO: 32 PG (ref 26–34)
MCHC RBC AUTO-ENTMCNC: 31.1 G/DL (ref 32–36)
MCHC RBC AUTO-ENTMCNC: 32.6 G/DL (ref 32–36)
MCV RBC AUTO: 101 FL (ref 80–100)
MCV RBC AUTO: 98 FL (ref 80–100)
METHADONE UR QL SCN>300 NG/ML: NEGATIVE
MONOCYTES # BLD AUTO: 0.51 X10*3/UL (ref 0.1–1)
MONOCYTES # BLD AUTO: 0.82 X10*3/UL (ref 0.1–1)
MONOCYTES NFR BLD AUTO: 3.6 %
MONOCYTES NFR BLD AUTO: 4.6 %
NEUTROPHILS # BLD AUTO: 12.72 X10*3/UL (ref 1.2–7.7)
NEUTROPHILS # BLD AUTO: 15.69 X10*3/UL (ref 1.2–7.7)
NEUTROPHILS NFR BLD AUTO: 88.6 %
NEUTROPHILS NFR BLD AUTO: 90.4 %
NRBC BLD-RTO: 0 /100 WBCS (ref 0–0)
NRBC BLD-RTO: 0 /100 WBCS (ref 0–0)
OPIATES UR QL SCN>300 NG/ML: NEGATIVE
OXYCODONE UR QL: NEGATIVE
OXYHGB MFR BLDCOA: 17.1 % (ref 94–98)
OXYHGB MFR BLDCOV: 36.3 % (ref 94–98)
PCO2 BLDCOA: 78 MM HG (ref 31–75)
PCO2 BLDCOV: 69 MM HG (ref 22–53)
PCP UR QL SCN>25 NG/ML: NEGATIVE
PH BLDCOA: 7.09 PH (ref 7.08–7.39)
PH BLDCOV: 7.12 PH (ref 7.19–7.47)
PLATELET # BLD AUTO: 104 X10*3/UL (ref 150–450)
PLATELET # BLD AUTO: 133 X10*3/UL (ref 150–450)
PMV BLD AUTO: 12.9 FL (ref 7.5–11.5)
PMV BLD AUTO: 13.2 FL (ref 7.5–11.5)
PO2 BLDCOA: 19 MM HG (ref 5–31)
PO2 BLDCOV: 27 MM HG (ref 13–37)
POTASSIUM SERPL-SCNC: 3.6 MMOL/L (ref 3.5–5.3)
POTASSIUM SERPL-SCNC: 3.9 MMOL/L (ref 3.4–5.1)
PROT SERPL-MCNC: 3.9 G/DL (ref 6.4–8.2)
PROT SERPL-MCNC: 5.5 G/DL (ref 5.9–7.9)
PROTHROMBIN TIME: 9.7 SECONDS (ref 9.3–12.7)
RBC # BLD AUTO: 2.41 X10*6/UL (ref 4–5.2)
RBC # BLD AUTO: 3.31 X10*6/UL (ref 4–5.2)
RH FACTOR (ANTIGEN D): NORMAL
SAO2 % BLDCOA: 17 % (ref 3–69)
SAO2 % BLDCOV: 37 % (ref 16–84)
SODIUM SERPL-SCNC: 126 MMOL/L (ref 136–145)
SODIUM SERPL-SCNC: 133 MMOL/L (ref 133–145)
T PALLIDUM AB SER QL: NONREACTIVE
URATE SERPL-MCNC: 3.8 MG/DL (ref 2.5–6.8)
WBC # BLD AUTO: 14.1 X10*3/UL (ref 4.4–11.3)
WBC # BLD AUTO: 17.7 X10*3/UL (ref 4.4–11.3)

## 2023-10-31 PROCEDURE — 84550 ASSAY OF BLOOD/URIC ACID: CPT | Performed by: OBSTETRICS & GYNECOLOGY

## 2023-10-31 PROCEDURE — 01967 NEURAXL LBR ANES VAG DLVR: CPT | Performed by: NURSE ANESTHETIST, CERTIFIED REGISTERED

## 2023-10-31 PROCEDURE — 80053 COMPREHEN METABOLIC PANEL: CPT | Performed by: STUDENT IN AN ORGANIZED HEALTH CARE EDUCATION/TRAINING PROGRAM

## 2023-10-31 PROCEDURE — 2500000004 HC RX 250 GENERAL PHARMACY W/ HCPCS (ALT 636 FOR OP/ED): Performed by: STUDENT IN AN ORGANIZED HEALTH CARE EDUCATION/TRAINING PROGRAM

## 2023-10-31 PROCEDURE — 59050 FETAL MONITOR W/REPORT: CPT

## 2023-10-31 PROCEDURE — 3E033VJ INTRODUCTION OF OTHER HORMONE INTO PERIPHERAL VEIN, PERCUTANEOUS APPROACH: ICD-10-PCS | Performed by: OBSTETRICS & GYNECOLOGY

## 2023-10-31 PROCEDURE — 36600 WITHDRAWAL OF ARTERIAL BLOOD: CPT

## 2023-10-31 PROCEDURE — 2500000004 HC RX 250 GENERAL PHARMACY W/ HCPCS (ALT 636 FOR OP/ED): Performed by: NURSE ANESTHETIST, CERTIFIED REGISTERED

## 2023-10-31 PROCEDURE — 94760 N-INVAS EAR/PLS OXIMETRY 1: CPT

## 2023-10-31 PROCEDURE — 89240 UNLISTED MISC PATH TEST: CPT | Performed by: OBSTETRICS & GYNECOLOGY

## 2023-10-31 PROCEDURE — 86850 RBC ANTIBODY SCREEN: CPT

## 2023-10-31 PROCEDURE — 86900 BLOOD TYPING SEROLOGIC ABO: CPT

## 2023-10-31 PROCEDURE — 88307 TISSUE EXAM BY PATHOLOGIST: CPT | Mod: TC,SUR,TRILAB | Performed by: OBSTETRICS & GYNECOLOGY

## 2023-10-31 PROCEDURE — 82805 BLOOD GASES W/O2 SATURATION: CPT | Performed by: OBSTETRICS & GYNECOLOGY

## 2023-10-31 PROCEDURE — 83615 LACTATE (LD) (LDH) ENZYME: CPT | Performed by: OBSTETRICS & GYNECOLOGY

## 2023-10-31 PROCEDURE — 3700000018 HC OB ANESTHESIA C-SECTION: Performed by: OBSTETRICS & GYNECOLOGY

## 2023-10-31 PROCEDURE — 80053 COMPREHEN METABOLIC PANEL: CPT | Performed by: OBSTETRICS & GYNECOLOGY

## 2023-10-31 PROCEDURE — 2500000004 HC RX 250 GENERAL PHARMACY W/ HCPCS (ALT 636 FOR OP/ED): Performed by: OBSTETRICS & GYNECOLOGY

## 2023-10-31 PROCEDURE — 85025 COMPLETE CBC W/AUTO DIFF WBC: CPT | Performed by: OBSTETRICS & GYNECOLOGY

## 2023-10-31 PROCEDURE — 1120000001 HC OB PRIVATE ROOM DAILY

## 2023-10-31 PROCEDURE — 99223 1ST HOSP IP/OBS HIGH 75: CPT

## 2023-10-31 PROCEDURE — 88307 TISSUE EXAM BY PATHOLOGIST: CPT | Mod: TC,TRILAB | Performed by: OBSTETRICS & GYNECOLOGY

## 2023-10-31 PROCEDURE — 70544 MR ANGIOGRAPHY HEAD W/O DYE: CPT | Mod: 59

## 2023-10-31 PROCEDURE — 99199 UNLISTED SPECIAL SVC PX/RPRT: CPT

## 2023-10-31 PROCEDURE — 83615 LACTATE (LD) (LDH) ENZYME: CPT | Performed by: STUDENT IN AN ORGANIZED HEALTH CARE EDUCATION/TRAINING PROGRAM

## 2023-10-31 PROCEDURE — 3700000014 HC AN EPIDURAL BLOCK CHARGE: Performed by: OBSTETRICS & GYNECOLOGY

## 2023-10-31 PROCEDURE — 85025 COMPLETE CBC W/AUTO DIFF WBC: CPT | Performed by: STUDENT IN AN ORGANIZED HEALTH CARE EDUCATION/TRAINING PROGRAM

## 2023-10-31 PROCEDURE — 93010 ELECTROCARDIOGRAM REPORT: CPT | Performed by: INTERNAL MEDICINE

## 2023-10-31 PROCEDURE — 7210000002 HC LABOR PER HOUR

## 2023-10-31 PROCEDURE — 87040 BLOOD CULTURE FOR BACTERIA: CPT | Mod: TRILAB | Performed by: OBSTETRICS & GYNECOLOGY

## 2023-10-31 PROCEDURE — 59514 CESAREAN DELIVERY ONLY: CPT | Performed by: OBSTETRICS & GYNECOLOGY

## 2023-10-31 PROCEDURE — 85730 THROMBOPLASTIN TIME PARTIAL: CPT | Performed by: OBSTETRICS & GYNECOLOGY

## 2023-10-31 PROCEDURE — 36415 COLL VENOUS BLD VENIPUNCTURE: CPT | Performed by: OBSTETRICS & GYNECOLOGY

## 2023-10-31 PROCEDURE — 51702 INSERT TEMP BLADDER CATH: CPT

## 2023-10-31 PROCEDURE — 51701 INSERT BLADDER CATHETER: CPT

## 2023-10-31 PROCEDURE — 70553 MRI BRAIN STEM W/O & W/DYE: CPT

## 2023-10-31 PROCEDURE — 85384 FIBRINOGEN ACTIVITY: CPT | Performed by: OBSTETRICS & GYNECOLOGY

## 2023-10-31 PROCEDURE — 7100000016 HC LABOR RECOVERY PER HOUR

## 2023-10-31 PROCEDURE — 86901 BLOOD TYPING SEROLOGIC RH(D): CPT

## 2023-10-31 PROCEDURE — 80307 DRUG TEST PRSMV CHEM ANLYZR: CPT | Performed by: OBSTETRICS & GYNECOLOGY

## 2023-10-31 PROCEDURE — 88307 TISSUE EXAM BY PATHOLOGIST: CPT | Performed by: PATHOLOGY

## 2023-10-31 PROCEDURE — 01968 ANES/ANALG CS DLVR NEURAXIAL: CPT | Performed by: NURSE ANESTHETIST, CERTIFIED REGISTERED

## 2023-10-31 PROCEDURE — 2500000005 HC RX 250 GENERAL PHARMACY W/O HCPCS: Performed by: NURSE ANESTHETIST, CERTIFIED REGISTERED

## 2023-10-31 PROCEDURE — 59510 CESAREAN DELIVERY: CPT | Performed by: OBSTETRICS & GYNECOLOGY

## 2023-10-31 PROCEDURE — 84075 ASSAY ALKALINE PHOSPHATASE: CPT | Performed by: STUDENT IN AN ORGANIZED HEALTH CARE EDUCATION/TRAINING PROGRAM

## 2023-10-31 PROCEDURE — 85610 PROTHROMBIN TIME: CPT | Performed by: OBSTETRICS & GYNECOLOGY

## 2023-10-31 RX ORDER — NIFEDIPINE 10 MG/1
10 CAPSULE ORAL ONCE AS NEEDED
Status: DISCONTINUED | OUTPATIENT
Start: 2023-10-31 | End: 2023-11-01 | Stop reason: HOSPADM

## 2023-10-31 RX ORDER — OXYTOCIN/0.9 % SODIUM CHLORIDE 30/500 ML
60 PLASTIC BAG, INJECTION (ML) INTRAVENOUS ONCE AS NEEDED
Status: DISCONTINUED | OUTPATIENT
Start: 2023-10-31 | End: 2023-11-01 | Stop reason: HOSPADM

## 2023-10-31 RX ORDER — OXYCODONE HYDROCHLORIDE 5 MG/1
5 TABLET ORAL EVERY 4 HOURS PRN
Status: DISCONTINUED | OUTPATIENT
Start: 2023-11-01 | End: 2023-11-01 | Stop reason: HOSPADM

## 2023-10-31 RX ORDER — METHYLERGONOVINE MALEATE 0.2 MG/ML
0.2 INJECTION INTRAVENOUS ONCE AS NEEDED
Status: DISCONTINUED | OUTPATIENT
Start: 2023-10-31 | End: 2023-11-01 | Stop reason: HOSPADM

## 2023-10-31 RX ORDER — LIDOCAINE 560 MG/1
1 PATCH PERCUTANEOUS; TOPICAL; TRANSDERMAL
Status: DISCONTINUED | OUTPATIENT
Start: 2023-10-31 | End: 2023-10-31 | Stop reason: HOSPADM

## 2023-10-31 RX ORDER — AZITHROMYCIN 100 MG/ML
INJECTION, POWDER, LYOPHILIZED, FOR SOLUTION INTRAVENOUS AS NEEDED
Status: DISCONTINUED | OUTPATIENT
Start: 2023-10-31 | End: 2023-10-31

## 2023-10-31 RX ORDER — MAGNESIUM SULFATE HEPTAHYDRATE 40 MG/ML
2 INJECTION, SOLUTION INTRAVENOUS CONTINUOUS
Status: DISCONTINUED | OUTPATIENT
Start: 2023-10-31 | End: 2023-10-31 | Stop reason: HOSPADM

## 2023-10-31 RX ORDER — MISOPROSTOL 200 UG/1
800 TABLET ORAL ONCE AS NEEDED
Status: DISCONTINUED | OUTPATIENT
Start: 2023-10-31 | End: 2023-10-31 | Stop reason: HOSPADM

## 2023-10-31 RX ORDER — DIPHENHYDRAMINE HCL 25 MG
25 CAPSULE ORAL EVERY 4 HOURS PRN
Status: DISCONTINUED | OUTPATIENT
Start: 2023-10-31 | End: 2023-10-31 | Stop reason: HOSPADM

## 2023-10-31 RX ORDER — MAGNESIUM SULFATE HEPTAHYDRATE 40 MG/ML
2 INJECTION, SOLUTION INTRAVENOUS CONTINUOUS
Status: DISCONTINUED | OUTPATIENT
Start: 2023-10-31 | End: 2023-11-01 | Stop reason: HOSPADM

## 2023-10-31 RX ORDER — CALCIUM GLUCONATE 98 MG/ML
1 INJECTION, SOLUTION INTRAVENOUS ONCE AS NEEDED
Status: DISCONTINUED | OUTPATIENT
Start: 2023-10-31 | End: 2023-10-31 | Stop reason: HOSPADM

## 2023-10-31 RX ORDER — KETOROLAC TROMETHAMINE 30 MG/ML
30 INJECTION, SOLUTION INTRAMUSCULAR; INTRAVENOUS EVERY 6 HOURS
Status: COMPLETED | OUTPATIENT
Start: 2023-10-31 | End: 2023-11-01

## 2023-10-31 RX ORDER — ONDANSETRON HYDROCHLORIDE 2 MG/ML
4 INJECTION, SOLUTION INTRAVENOUS EVERY 6 HOURS PRN
Status: DISCONTINUED | OUTPATIENT
Start: 2023-10-31 | End: 2023-10-31 | Stop reason: HOSPADM

## 2023-10-31 RX ORDER — IBUPROFEN 600 MG/1
600 TABLET ORAL EVERY 6 HOURS
Status: DISCONTINUED | OUTPATIENT
Start: 2023-11-01 | End: 2023-11-01 | Stop reason: HOSPADM

## 2023-10-31 RX ORDER — CARBOPROST TROMETHAMINE 250 UG/ML
250 INJECTION, SOLUTION INTRAMUSCULAR ONCE AS NEEDED
Status: DISCONTINUED | OUTPATIENT
Start: 2023-10-31 | End: 2023-10-31 | Stop reason: HOSPADM

## 2023-10-31 RX ORDER — HYDRALAZINE HYDROCHLORIDE 20 MG/ML
5 INJECTION INTRAMUSCULAR; INTRAVENOUS ONCE AS NEEDED
Status: DISCONTINUED | OUTPATIENT
Start: 2023-10-31 | End: 2023-11-01 | Stop reason: HOSPADM

## 2023-10-31 RX ORDER — SODIUM CHLORIDE, SODIUM LACTATE, POTASSIUM CHLORIDE, CALCIUM CHLORIDE 600; 310; 30; 20 MG/100ML; MG/100ML; MG/100ML; MG/100ML
75 INJECTION, SOLUTION INTRAVENOUS CONTINUOUS
Status: DISCONTINUED | OUTPATIENT
Start: 2023-10-31 | End: 2023-11-01 | Stop reason: HOSPADM

## 2023-10-31 RX ORDER — NALOXONE HYDROCHLORIDE 0.4 MG/ML
0.1 INJECTION, SOLUTION INTRAMUSCULAR; INTRAVENOUS; SUBCUTANEOUS EVERY 5 MIN PRN
Status: DISCONTINUED | OUTPATIENT
Start: 2023-10-31 | End: 2023-11-01 | Stop reason: HOSPADM

## 2023-10-31 RX ORDER — LABETALOL HYDROCHLORIDE 5 MG/ML
20 INJECTION, SOLUTION INTRAVENOUS ONCE AS NEEDED
Status: DISCONTINUED | OUTPATIENT
Start: 2023-10-31 | End: 2023-10-31 | Stop reason: HOSPADM

## 2023-10-31 RX ORDER — ONDANSETRON HYDROCHLORIDE 2 MG/ML
4 INJECTION, SOLUTION INTRAVENOUS EVERY 6 HOURS PRN
Status: DISCONTINUED | OUTPATIENT
Start: 2023-10-31 | End: 2023-11-01 | Stop reason: HOSPADM

## 2023-10-31 RX ORDER — OXYTOCIN/0.9 % SODIUM CHLORIDE 30/500 ML
60 PLASTIC BAG, INJECTION (ML) INTRAVENOUS ONCE AS NEEDED
Status: DISCONTINUED | OUTPATIENT
Start: 2023-10-31 | End: 2023-10-31 | Stop reason: HOSPADM

## 2023-10-31 RX ORDER — LOPERAMIDE HYDROCHLORIDE 2 MG/1
4 CAPSULE ORAL EVERY 2 HOUR PRN
Status: DISCONTINUED | OUTPATIENT
Start: 2023-10-31 | End: 2023-10-31 | Stop reason: HOSPADM

## 2023-10-31 RX ORDER — NALOXONE HYDROCHLORIDE 0.4 MG/ML
0.1 INJECTION, SOLUTION INTRAMUSCULAR; INTRAVENOUS; SUBCUTANEOUS EVERY 5 MIN PRN
Status: DISCONTINUED | OUTPATIENT
Start: 2023-10-31 | End: 2023-10-31 | Stop reason: HOSPADM

## 2023-10-31 RX ORDER — NIFEDIPINE 10 MG/1
10 CAPSULE ORAL ONCE AS NEEDED
Status: DISCONTINUED | OUTPATIENT
Start: 2023-10-31 | End: 2023-10-31 | Stop reason: HOSPADM

## 2023-10-31 RX ORDER — KETOROLAC TROMETHAMINE 30 MG/ML
30 INJECTION, SOLUTION INTRAMUSCULAR; INTRAVENOUS EVERY 6 HOURS
Status: DISCONTINUED | OUTPATIENT
Start: 2023-10-31 | End: 2023-10-31 | Stop reason: HOSPADM

## 2023-10-31 RX ORDER — TRANEXAMIC ACID 100 MG/ML
1000 INJECTION, SOLUTION INTRAVENOUS ONCE AS NEEDED
Status: DISCONTINUED | OUTPATIENT
Start: 2023-10-31 | End: 2023-11-01 | Stop reason: HOSPADM

## 2023-10-31 RX ORDER — PHENYLEPHRINE HCL IN 0.9% NACL 1 MG/10 ML
SYRINGE (ML) INTRAVENOUS AS NEEDED
Status: DISCONTINUED | OUTPATIENT
Start: 2023-10-31 | End: 2023-10-31

## 2023-10-31 RX ORDER — OXYTOCIN 10 [USP'U]/ML
10 INJECTION, SOLUTION INTRAMUSCULAR; INTRAVENOUS ONCE AS NEEDED
Status: DISCONTINUED | OUTPATIENT
Start: 2023-10-31 | End: 2023-11-01 | Stop reason: HOSPADM

## 2023-10-31 RX ORDER — ADHESIVE BANDAGE
10 BANDAGE TOPICAL
Status: DISCONTINUED | OUTPATIENT
Start: 2023-10-31 | End: 2023-10-31 | Stop reason: HOSPADM

## 2023-10-31 RX ORDER — LEVETIRACETAM 500 MG/1
500 TABLET ORAL 2 TIMES DAILY
Status: DISCONTINUED | OUTPATIENT
Start: 2023-10-31 | End: 2023-11-01

## 2023-10-31 RX ORDER — CEFAZOLIN SODIUM 2 G/100ML
2 INJECTION, SOLUTION INTRAVENOUS EVERY 8 HOURS
Status: DISCONTINUED | OUTPATIENT
Start: 2023-10-31 | End: 2023-10-31 | Stop reason: HOSPADM

## 2023-10-31 RX ORDER — ONDANSETRON 4 MG/1
4 TABLET, FILM COATED ORAL EVERY 6 HOURS PRN
Status: DISCONTINUED | OUTPATIENT
Start: 2023-10-31 | End: 2023-10-31 | Stop reason: HOSPADM

## 2023-10-31 RX ORDER — POLYETHYLENE GLYCOL 3350 17 G/17G
17 POWDER, FOR SOLUTION ORAL 2 TIMES DAILY PRN
Status: DISCONTINUED | OUTPATIENT
Start: 2023-10-31 | End: 2023-11-01 | Stop reason: HOSPADM

## 2023-10-31 RX ORDER — CARBOPROST TROMETHAMINE 250 UG/ML
250 INJECTION, SOLUTION INTRAMUSCULAR ONCE AS NEEDED
Status: DISCONTINUED | OUTPATIENT
Start: 2023-10-31 | End: 2023-11-01 | Stop reason: HOSPADM

## 2023-10-31 RX ORDER — LORAZEPAM 2 MG/ML
2 INJECTION INTRAMUSCULAR EVERY 5 MIN PRN
Status: DISCONTINUED | OUTPATIENT
Start: 2023-10-31 | End: 2023-11-01 | Stop reason: HOSPADM

## 2023-10-31 RX ORDER — CITALOPRAM 40 MG/1
80 TABLET, FILM COATED ORAL DAILY
Status: DISCONTINUED | OUTPATIENT
Start: 2023-11-01 | End: 2023-11-01 | Stop reason: HOSPADM

## 2023-10-31 RX ORDER — OXYTOCIN 10 [USP'U]/ML
10 INJECTION, SOLUTION INTRAMUSCULAR; INTRAVENOUS ONCE AS NEEDED
Status: DISCONTINUED | OUTPATIENT
Start: 2023-10-31 | End: 2023-10-31 | Stop reason: HOSPADM

## 2023-10-31 RX ORDER — SUCCINYLCHOLINE CHLORIDE 20 MG/ML
INJECTION INTRAMUSCULAR; INTRAVENOUS AS NEEDED
Status: DISCONTINUED | OUTPATIENT
Start: 2023-10-31 | End: 2023-10-31

## 2023-10-31 RX ORDER — DIPHENHYDRAMINE HYDROCHLORIDE 50 MG/ML
25 INJECTION INTRAMUSCULAR; INTRAVENOUS EVERY 4 HOURS PRN
Status: DISCONTINUED | OUTPATIENT
Start: 2023-10-31 | End: 2023-10-31 | Stop reason: HOSPADM

## 2023-10-31 RX ORDER — NALBUPHINE HYDROCHLORIDE 10 MG/ML
5 INJECTION, SOLUTION INTRAMUSCULAR; INTRAVENOUS; SUBCUTANEOUS
Status: DISCONTINUED | OUTPATIENT
Start: 2023-10-31 | End: 2023-11-01 | Stop reason: HOSPADM

## 2023-10-31 RX ORDER — ACETAMINOPHEN 325 MG/1
975 TABLET ORAL EVERY 6 HOURS SCHEDULED
Status: DISCONTINUED | OUTPATIENT
Start: 2023-10-31 | End: 2023-10-31 | Stop reason: HOSPADM

## 2023-10-31 RX ORDER — ADHESIVE BANDAGE
10 BANDAGE TOPICAL
Status: DISCONTINUED | OUTPATIENT
Start: 2023-10-31 | End: 2023-11-01 | Stop reason: HOSPADM

## 2023-10-31 RX ORDER — LEVETIRACETAM 5 MG/ML
500 INJECTION INTRAVASCULAR EVERY 12 HOURS
Status: DISCONTINUED | OUTPATIENT
Start: 2023-10-31 | End: 2023-10-31 | Stop reason: HOSPADM

## 2023-10-31 RX ORDER — HYDRALAZINE HYDROCHLORIDE 20 MG/ML
5 INJECTION INTRAMUSCULAR; INTRAVENOUS ONCE AS NEEDED
Status: DISCONTINUED | OUTPATIENT
Start: 2023-10-31 | End: 2023-10-31 | Stop reason: HOSPADM

## 2023-10-31 RX ORDER — LIDOCAINE 560 MG/1
1 PATCH PERCUTANEOUS; TOPICAL; TRANSDERMAL
Status: DISCONTINUED | OUTPATIENT
Start: 2023-10-31 | End: 2023-11-01 | Stop reason: HOSPADM

## 2023-10-31 RX ORDER — ACETAMINOPHEN 325 MG/1
975 TABLET ORAL EVERY 6 HOURS
Status: DISCONTINUED | OUTPATIENT
Start: 2023-10-31 | End: 2023-11-01 | Stop reason: HOSPADM

## 2023-10-31 RX ORDER — LOPERAMIDE HYDROCHLORIDE 2 MG/1
4 CAPSULE ORAL EVERY 2 HOUR PRN
Status: DISCONTINUED | OUTPATIENT
Start: 2023-10-31 | End: 2023-11-01 | Stop reason: HOSPADM

## 2023-10-31 RX ORDER — CALCIUM GLUCONATE 98 MG/ML
1 INJECTION, SOLUTION INTRAVENOUS ONCE AS NEEDED
Status: DISCONTINUED | OUTPATIENT
Start: 2023-10-31 | End: 2023-11-01 | Stop reason: HOSPADM

## 2023-10-31 RX ORDER — ACETAMINOPHEN 325 MG/1
975 TABLET ORAL EVERY 6 HOURS PRN
OUTPATIENT
Start: 2023-10-31 | End: 2023-11-01

## 2023-10-31 RX ORDER — TRANEXAMIC ACID 100 MG/ML
1000 INJECTION, SOLUTION INTRAVENOUS ONCE AS NEEDED
Status: DISCONTINUED | OUTPATIENT
Start: 2023-10-31 | End: 2023-10-31 | Stop reason: HOSPADM

## 2023-10-31 RX ORDER — IBUPROFEN 600 MG/1
600 TABLET ORAL EVERY 6 HOURS
Status: DISCONTINUED | OUTPATIENT
Start: 2023-11-01 | End: 2023-10-31 | Stop reason: HOSPADM

## 2023-10-31 RX ORDER — DIPHENHYDRAMINE HCL 25 MG
25 CAPSULE ORAL EVERY 4 HOURS PRN
Status: DISCONTINUED | OUTPATIENT
Start: 2023-10-31 | End: 2023-11-01 | Stop reason: HOSPADM

## 2023-10-31 RX ORDER — BISACODYL 10 MG/1
10 SUPPOSITORY RECTAL DAILY PRN
Status: DISCONTINUED | OUTPATIENT
Start: 2023-10-31 | End: 2023-11-01 | Stop reason: HOSPADM

## 2023-10-31 RX ORDER — ONDANSETRON 4 MG/1
4 TABLET, FILM COATED ORAL EVERY 6 HOURS PRN
Status: DISCONTINUED | OUTPATIENT
Start: 2023-10-31 | End: 2023-11-01 | Stop reason: HOSPADM

## 2023-10-31 RX ORDER — LIDOCAINE HYDROCHLORIDE 20 MG/ML
INJECTION, SOLUTION EPIDURAL; INFILTRATION; INTRACAUDAL; PERINEURAL AS NEEDED
Status: DISCONTINUED | OUTPATIENT
Start: 2023-10-31 | End: 2023-10-31

## 2023-10-31 RX ORDER — MISOPROSTOL 200 UG/1
800 TABLET ORAL ONCE AS NEEDED
Status: DISCONTINUED | OUTPATIENT
Start: 2023-10-31 | End: 2023-11-01 | Stop reason: HOSPADM

## 2023-10-31 RX ORDER — OXYCODONE HYDROCHLORIDE 5 MG/1
5 TABLET ORAL EVERY 4 HOURS PRN
Status: DISCONTINUED | OUTPATIENT
Start: 2023-11-01 | End: 2023-10-31 | Stop reason: HOSPADM

## 2023-10-31 RX ORDER — LIDOCAINE HYDROCHLORIDE AND EPINEPHRINE 15; 5 MG/ML; UG/ML
INJECTION, SOLUTION EPIDURAL AS NEEDED
Status: DISCONTINUED | OUTPATIENT
Start: 2023-10-31 | End: 2023-10-31

## 2023-10-31 RX ORDER — NORETHINDRONE AND ETHINYL ESTRADIOL 0.5-0.035
KIT ORAL AS NEEDED
Status: DISCONTINUED | OUTPATIENT
Start: 2023-10-31 | End: 2023-10-31

## 2023-10-31 RX ORDER — LABETALOL HYDROCHLORIDE 5 MG/ML
20 INJECTION, SOLUTION INTRAVENOUS ONCE AS NEEDED
Status: DISCONTINUED | OUTPATIENT
Start: 2023-10-31 | End: 2023-11-01 | Stop reason: HOSPADM

## 2023-10-31 RX ORDER — DIPHENHYDRAMINE HYDROCHLORIDE 50 MG/ML
25 INJECTION INTRAMUSCULAR; INTRAVENOUS EVERY 4 HOURS PRN
Status: DISCONTINUED | OUTPATIENT
Start: 2023-10-31 | End: 2023-11-01 | Stop reason: HOSPADM

## 2023-10-31 RX ORDER — BUTORPHANOL TARTRATE 2 MG/ML
2 INJECTION INTRAMUSCULAR; INTRAVENOUS
Status: DISCONTINUED | OUTPATIENT
Start: 2023-10-31 | End: 2023-10-31 | Stop reason: HOSPADM

## 2023-10-31 RX ORDER — OXYCODONE HYDROCHLORIDE 5 MG/1
10 TABLET ORAL EVERY 4 HOURS PRN
Status: DISCONTINUED | OUTPATIENT
Start: 2023-11-01 | End: 2023-11-01 | Stop reason: HOSPADM

## 2023-10-31 RX ORDER — FENTANYL/BUPIVACAINE/NS/PF 2MCG/ML-.1
0-30 PLASTIC BAG, INJECTION (ML) INJECTION CONTINUOUS
Status: DISCONTINUED | OUTPATIENT
Start: 2023-10-31 | End: 2023-10-31 | Stop reason: HOSPADM

## 2023-10-31 RX ORDER — NALBUPHINE HYDROCHLORIDE 10 MG/ML
5 INJECTION, SOLUTION INTRAMUSCULAR; INTRAVENOUS; SUBCUTANEOUS
Status: DISCONTINUED | OUTPATIENT
Start: 2023-10-31 | End: 2023-10-31 | Stop reason: HOSPADM

## 2023-10-31 RX ORDER — SIMETHICONE 80 MG
80 TABLET,CHEWABLE ORAL 4 TIMES DAILY PRN
Status: DISCONTINUED | OUTPATIENT
Start: 2023-10-31 | End: 2023-10-31 | Stop reason: HOSPADM

## 2023-10-31 RX ORDER — SIMETHICONE 80 MG
80 TABLET,CHEWABLE ORAL 4 TIMES DAILY PRN
Status: DISCONTINUED | OUTPATIENT
Start: 2023-10-31 | End: 2023-11-01 | Stop reason: HOSPADM

## 2023-10-31 RX ORDER — POLYETHYLENE GLYCOL 3350 17 G/17G
17 POWDER, FOR SOLUTION ORAL 2 TIMES DAILY PRN
Status: DISCONTINUED | OUTPATIENT
Start: 2023-10-31 | End: 2023-10-31 | Stop reason: HOSPADM

## 2023-10-31 RX ORDER — PROPOFOL 10 MG/ML
INJECTION, EMULSION INTRAVENOUS AS NEEDED
Status: DISCONTINUED | OUTPATIENT
Start: 2023-10-31 | End: 2023-10-31

## 2023-10-31 RX ORDER — OXYCODONE HYDROCHLORIDE 5 MG/1
10 TABLET ORAL EVERY 4 HOURS PRN
Status: DISCONTINUED | OUTPATIENT
Start: 2023-11-01 | End: 2023-10-31 | Stop reason: HOSPADM

## 2023-10-31 RX ORDER — MIDAZOLAM HYDROCHLORIDE 1 MG/ML
INJECTION, SOLUTION INTRAMUSCULAR; INTRAVENOUS AS NEEDED
Status: DISCONTINUED | OUTPATIENT
Start: 2023-10-31 | End: 2023-10-31

## 2023-10-31 RX ORDER — BISACODYL 10 MG/1
10 SUPPOSITORY RECTAL DAILY PRN
Status: DISCONTINUED | OUTPATIENT
Start: 2023-10-31 | End: 2023-10-31 | Stop reason: HOSPADM

## 2023-10-31 RX ORDER — HYDROMORPHONE HYDROCHLORIDE 1 MG/ML
0.2 INJECTION, SOLUTION INTRAMUSCULAR; INTRAVENOUS; SUBCUTANEOUS EVERY 5 MIN PRN
Status: DISCONTINUED | OUTPATIENT
Start: 2023-10-31 | End: 2023-11-01 | Stop reason: HOSPADM

## 2023-10-31 RX ADMIN — LIDOCAINE HYDROCHLORIDE AND EPINEPHRINE 3 ML: 15; 5 INJECTION, SOLUTION EPIDURAL at 07:50

## 2023-10-31 RX ADMIN — ACETAMINOPHEN 975 MG: 325 TABLET ORAL at 20:33

## 2023-10-31 RX ADMIN — Medication 100 MCG: at 13:54

## 2023-10-31 RX ADMIN — Medication 15 ML/HR: at 08:01

## 2023-10-31 RX ADMIN — CEFAZOLIN SODIUM 2 G: 2 INJECTION, SOLUTION INTRAVENOUS at 13:00

## 2023-10-31 RX ADMIN — Medication 100 MCG: at 13:25

## 2023-10-31 RX ADMIN — SODIUM CHLORIDE, SODIUM LACTATE, POTASSIUM CHLORIDE, AND CALCIUM CHLORIDE 125 ML/HR: 600; 310; 30; 20 INJECTION, SOLUTION INTRAVENOUS at 10:50

## 2023-10-31 RX ADMIN — AZITHROMYCIN 500 MG: 500 INJECTION, POWDER, LYOPHILIZED, FOR SOLUTION INTRAVENOUS at 13:42

## 2023-10-31 RX ADMIN — SUCCINYLCHOLINE CHLORIDE 100 MG: 20 INJECTION, SOLUTION INTRAMUSCULAR; INTRAVENOUS at 12:57

## 2023-10-31 RX ADMIN — Medication 100 MCG: at 13:45

## 2023-10-31 RX ADMIN — KETOROLAC TROMETHAMINE 30 MG: 30 INJECTION, SOLUTION INTRAMUSCULAR; INTRAVENOUS at 20:34

## 2023-10-31 RX ADMIN — SODIUM CHLORIDE, SODIUM LACTATE, POTASSIUM CHLORIDE, AND CALCIUM CHLORIDE 75 ML/HR: 600; 310; 30; 20 INJECTION, SOLUTION INTRAVENOUS at 19:30

## 2023-10-31 RX ADMIN — OXYTOCIN 600 MILLI-UNITS/MIN: 10 INJECTION, SOLUTION INTRAMUSCULAR; INTRAVENOUS at 13:02

## 2023-10-31 RX ADMIN — SUCCINYLCHOLINE CHLORIDE 40 MG: 20 INJECTION, SOLUTION INTRAMUSCULAR; INTRAVENOUS at 13:08

## 2023-10-31 RX ADMIN — GADOTERATE MEGLUMINE 20 ML: 376.9 INJECTION INTRAVENOUS at 23:30

## 2023-10-31 RX ADMIN — ONDANSETRON HYDROCHLORIDE 4 MG: 2 INJECTION INTRAMUSCULAR; INTRAVENOUS at 13:26

## 2023-10-31 RX ADMIN — Medication 100 MCG: at 13:31

## 2023-10-31 RX ADMIN — Medication 100 MCG: at 12:59

## 2023-10-31 RX ADMIN — SODIUM CHLORIDE, SODIUM LACTATE, POTASSIUM CHLORIDE, AND CALCIUM CHLORIDE 125 ML/HR: 600; 310; 30; 20 INJECTION, SOLUTION INTRAVENOUS at 01:48

## 2023-10-31 RX ADMIN — LIDOCAINE HYDROCHLORIDE 100 MG: 20 INJECTION, SOLUTION EPIDURAL; INFILTRATION; INTRACAUDAL; PERINEURAL at 13:07

## 2023-10-31 RX ADMIN — PROPOFOL 60 MG: 10 INJECTION, EMULSION INTRAVENOUS at 12:57

## 2023-10-31 RX ADMIN — Medication 100 MCG: at 13:27

## 2023-10-31 RX ADMIN — EPHEDRINE SULFATE 15 MG: 50 INJECTION, SOLUTION INTRAVENOUS at 12:57

## 2023-10-31 RX ADMIN — MIDAZOLAM 2 MG: 1 INJECTION INTRAMUSCULAR; INTRAVENOUS at 13:02

## 2023-10-31 RX ADMIN — PROPOFOL 40 MG: 10 INJECTION, EMULSION INTRAVENOUS at 13:12

## 2023-10-31 SDOH — HEALTH STABILITY: MENTAL HEALTH: CURRENT SMOKER: 0

## 2023-10-31 ASSESSMENT — PAIN SCALES - GENERAL
PAINLEVEL_OUTOF10: 6
PAINLEVEL_OUTOF10: 7
PAINLEVEL_OUTOF10: 0 - NO PAIN
PAINLEVEL_OUTOF10: 8
PAINLEVEL_OUTOF10: 0 - NO PAIN
PAINLEVEL_OUTOF10: 0 - NO PAIN
PAIN_LEVEL: 0
PAINLEVEL_OUTOF10: 7
PAINLEVEL_OUTOF10: 0 - NO PAIN
PAINLEVEL_OUTOF10: 8
PAINLEVEL_OUTOF10: 0 - NO PAIN
PAINLEVEL_OUTOF10: 8
PAINLEVEL_OUTOF10: 5 - MODERATE PAIN
PAINLEVEL_OUTOF10: 0 - NO PAIN

## 2023-10-31 ASSESSMENT — PAIN DESCRIPTION - DESCRIPTORS: DESCRIPTORS: SORE;CRAMPING

## 2023-10-31 NOTE — PROGRESS NOTES
Subjective   Genna Garner is a 38 y.o. female for elective induction      Objective    Vitals:    10/31/23 0849   BP: 111/67   Pulse: 65   Resp:    Temp:    SpO2:      EFM Cqategory 1   Contractions Q 2-3 minutes  Cervix 2-3/90/0; AROM-clear        Assessment/Plan    pitocin

## 2023-10-31 NOTE — ANESTHESIA PROCEDURE NOTES
Epidural Block    Patient location during procedure: OB  Start time: 10/31/2023 7:27 AM  End time: 10/31/2023 7:58 AM  Reason for block: labor analgesia  Staffing  Performed: CRNA   Authorized by: RUSLAN Guaman    Performed by: RUSLAN Guaman    Preanesthetic Checklist  Completed: patient identified, IV checked, risks and benefits discussed, surgical consent, pre-op evaluation, timeout performed and sterile techniques followed  Block Timeout  RN/Licensed healthcare professional reads aloud to the Anesthesia provider and entire team: Patient identity, procedure with side and site, patient position, and as applicable the availability of implants/special equipment/special requirements.  Patient on coagulant treatment: no  Timeout performed at: 10/31/2023 7:28 AM  Block Placement  Patient position: sitting  Prep: Betadine  Sterility prep: cap, drape, gloves and mask  Sedation level: no sedation  Patient monitoring: blood pressure, continuous pulse oximetry and heart rate  Approach: midline  Local numbing: lidocaine 1% to skin and subcutaneous tissues  Vertebral space: lumbar  Lumbar location: L3-L4  Epidural  Loss of resistance technique: air  Guidance: landmark technique        Needle  Needle type: Tuohy   Needle gauge: 17  Needle length: 8.9cm  Needle insertion depth: 5 cm  Catheter size: 19 G  Catheter at skin depth: 10 cm  Catheter securement method: clear occlusive dressing and surgical tape    Test dose: lidocaine 1.5% with epinephrine 1-to-200,000  Test dose given at 10/31/2023 7:50 AM  Test dose: lidocaine 1.5% with epinephrine 1-to-200,000  Test dose result: no positive test dose    PCEA  Medication concentration used: 0.1% Bupivacaine with 2 mcg/mL Fentanyl  Dose (mL): 5  Lockout (minutes): 20  1-Hour Limit (boluses/hr): 3  Basal Rate: 15        Assessment  Sensory level: T6 bilateral  Block outcome: pain improved  Number of attempts: 2  Events: no positive test dose  Procedure  assessment: patient tolerated procedure well with no immediate complications  Additional Notes  The noted that they had issues placing her last epidural and she also had to have it replaced. She did also note she gets very worked up for IVs and anything with needles. Placement is estimated at L3-L4, catheter passed easy.  The patient's pain with contractions was improved.           Adult

## 2023-10-31 NOTE — PROGRESS NOTES
Intrapartum Progress Note    Assessment/Plan     39 yo  @ 39w2d, witnessed seizure at the bedside, followed by fetal bradycardia.    Called to patient's room due to nursing witnessing seizure activity. Pt groggy, and confused on my arrival to the room. Cervix checked and pt noted to be 4-5cm/80/-2. Fetal bradycardia noted to 70s. Maternal BP noted to be 60/30, and maternal heart rate of 59. Pitocin stopped, positions changed. Scalp electrode applied to confirm FHR. FHR remained down and decision was made to proceed to OR for emergent . Anesthesia and Dr. Avelar notified.     On arrival to OR, pt prepped for surgery, and FHR noted to be 138. Then proceeded with surgery. See dictation from Dr. Avelar.         Objective   Last Vitals:  Temp Pulse Resp BP MAP Pulse Ox   36.9 °C (98.4 °F) (!) 51 17 (!) 65/37   (!) 85 %     Vitals Min/Max Last 24 Hours:  Temp  Min: 36.5 °C (97.7 °F)  Max: 37.1 °C (98.8 °F)  Pulse  Min: 51  Max: 82  Resp  Min: 16  Max: 18  BP  Min: 65/37  Max: 149/86

## 2023-10-31 NOTE — CONSULTS
EPILEPSY CONSULT NOTE         Epilepsy [id number]     Reason for consult: Seizure during labor     History of Present Illness: Genna Garner is a 38 y.o.  female with a PMHx of anxiety and depression  who presents to [facility] as a transfer from [facility] after a seizure during induction of labor.  Neurology consulted for seizure management.     She presented presented for induction of labor to [facility] on 10/30/31. During the induction she had GTC taken for emergency . On arrival of the primary team she was hypotensive, bradycardic, confused and drowsy,      Interventions   - Loaded with Keppra 2500 mg and started on 500 mg BID + Magnesium   - Magnesium 6 mg + infusion   - Acyclovir, Azithromycin, Cefazolin     Her pregnancy has been uncomplicated.She has no prior history of pre-eclampsia or hypertension. She has no prior history of seizures. Denies history febrile seizures.  Denies any recent infections. UDS negative.  The only medication she takes is Citalopram.      ROS: All systems reviewed and were negative except as above    Home Medications:   Medications Prior to Admission   Medication Sig Dispense Refill Last Dose    cholecalciferol (Vitamin D-3) 25 MCG (1000 UT) tablet Take by mouth.       citalopram (CeleXA) 40 mg tablet Take 2 tablets (80 mg) by mouth once daily.       prenatal vitamin, iron-folic, (Prenatal) 27 mg iron-800 mcg folic acid tablet Take 1 tablet by mouth.          Past Medical History:    has a past medical history of Allergy status to unspecified drugs, medicaments and biological substances.    Past Surgical History:    has a past surgical history that includes Other surgical history (2019).    Allergies:   Allergies   Allergen Reactions    Bee Pollen Unknown       Family History:   No family history on file.    Past Social History:    reports that she has never smoked. She has never used smokeless tobacco. She reports that she does not currently use alcohol. She reports that  she does not use drugs.    Vitals:   Temp:  [36.3 °C (97.3 °F)-37.2 °C (99 °F)] 37.2 °C (99 °F)  Heart Rate:  [] 99  Resp:  [16-18] 18  BP: ()/(37-86) 123/71    Physical Exam:   General Appearance: Drowsy, follows commands and answers questions appropriately,     Mental State: Orientation was normal to time, place and person.  Speech was fluent, repetition and naming were intact.    Cranial Nerves:   CN: Visual fields full to confrontation.   CN 3, 4, 6: Pupils round, 4 mm in diameter, equally reactive to light. Lids symmetric; no ptosis. EOMs normal alignment,    CN 5: Facial sensation intact bilaterally.   CN 7: Normal and symmetric facial strength. Nasolabial folds symmetric.   CN 8: Hearing intact to voice  CN 9: Palate elevates symmetrically.   CN 11: Normal strength of shoulder shrug and neck turning.   CN 12: Tongue midline, with normal bulk and strength; no fasciculations.     Motor: Muscle bulk and tone were normal in both upper and lower extremities. No fasciculations, tremor or other abnormal movements were present. Muscle strength was 5/5 in distal and proximal muscles in both upper and lower extremities. No fasciculations, tremor or other abnormal movements were present.     Reflexes: Right/ Left:  Biceps 3/3, brachioradialis 3/3, triceps 3/3, patellar 4/4 (non-sustained clonus bilaterally)  Babinski: toes downgoing to plantar stimulation. No clonus or other pathologic reflexes present.     Sensory: In both upper and lower extremities, sensation was intact to light touch    Coordination: Unable to assess as pt was drowsy    Gait: Deferred d/t patient safety     Labs:   CBC  Results from last 72 hours   Lab Units 10/31/23  2027 10/31/23  1602 10/30/23  2047   WBC AUTO x10*3/uL 14.1* 17.7* 9.5   HEMOGLOBIN g/dL 7.6* 10.6* 11.0*   HEMATOCRIT % 24.4* 32.5* 32.1*   PLATELETS AUTO x10*3/uL 104* 133* 140*     BMP  Results from last 72 hours   Lab Units 10/31/23  1602   SODIUM mmol/L 133   POTASSIUM  mmol/L 3.9   CHLORIDE mmol/L 103   BUN mg/dL 7*   CREATININE mg/dL 0.60         Urine Drug Screen: Negative    Assessment/Recommendations  Genna Garner is a 38 y.o.  female with a PMHx of anxiety and depression  who presents to [facility] as a transfer from [facility] after a seizure during induction of labor.  Neurology consulted for seizure management. She presented for induction of labor to induction of labor to [facility] on 10/30/31. During the induction she had seizure. She has no prior seizure history, no recent head trauma, no recent infections or other etiologies to provoke a seizure. Current differential includes eclampsia given border-line high pressures around induction ~130s, thrombocytopenia (plt 140 prior to induction), although LFTs WNL.  Otherwise,  neurological exam was non-focal except for symmetric brisk reflexes. At this time unable to obtain further history regarding semiology. Attempted to contact bedside nurse and  who witnessed seizure but was unsuccessful. Will retry in AM for further classification of episodes.     - Agree with aggressive magnesium therapy, per primary    - cvEEG   - c/w Acyclovir  - MRI/MRV With and without contrast  - Lumbar Puncture - cell count, total protein, rbc, cytology, meningitis panel, culture/smear  - Seizure precautions: Do not drive, not to use power tools or operate heavy machinery, or be on ladders.  You may continue to use the shower, but not the bath. Refrain from any activity which could result in injury to yourself or others if you had a seizure or lost consciousness. These restrictions should continue until instructed by a doctor to do otherwise.  These restrictions would be documented in the medical record.    This note will be staffed with an attending in the AM.    Ainsley Melgar MD  Department of Neurology, PGY-2

## 2023-10-31 NOTE — NURSING NOTE
RN at bedside for vaginal exam. Second RN EDY Billingsley called to bedside to verify exam. Upon arrival Patient began to have a seizure. Dr. Fournier called to bedside. Melani called and no answer. RN at bedside with provider and additional nurses

## 2023-10-31 NOTE — NURSING NOTE
RN called to room for verification of SVE.  Upon arrival to room patient unresposive to commands.  Eyes closed and rolled back, mouth open and rigid.  Hands and arms noted to be rigid.  Seizure suspected.  Patient placed on side and additional help to bedside.  Patient remained in state for 30 seconds.  Came around and disoriented.  Providers and additional staff at bedside.

## 2023-10-31 NOTE — ANESTHESIA PREPROCEDURE EVALUATION
Patient: Genna Garner    Evaluation Method: In-person visit    Procedure Information       Date/Time: 10/30/23 2000    Procedure: LABOR INDUCTION    Location: [facility] 3 Obstetrics and Gynecology            Relevant Problems   Anesthesia (within normal limits)      Cardiovascular (within normal limits)      Endocrine (within normal limits)      GI (within normal limits)      /Renal (within normal limits)      Neuro/Psych   (+) Anxiety   (+) Depression      Pulmonary (within normal limits)      Hematology (within normal limits)      Musculoskeletal (within normal limits)      Eyes, Ears, Nose, and Throat (within normal limits)       Clinical information reviewed:   Tobacco  Allergies  Meds   Med Hx  Surg Hx   Fam Hx          NPO Detail:  No data recorded     OB/Gyn Evaluation    Present Pregnancy    Patient is pregnant now.   Obstetric History                Physical Exam    Airway  Mallampati: I  TM distance: >3 FB  Neck ROM: full     Cardiovascular - normal exam     Dental - normal exam     Pulmonary - normal exam     Abdominal            Anesthesia Plan    ASA 1     epidural     The patient is not a current smoker.  Patient did not smoke on day of procedure.  Education provided regarding risk of obstructive sleep apnea.  Anesthetic plan and risks discussed with patient.  Use of blood products discussed with patient who consented to blood products.    Plan discussed with CRNA.

## 2023-10-31 NOTE — ANESTHESIA POSTPROCEDURE EVALUATION
Patient: Genna Garner    Procedure Summary       Date: 10/31/23 Room / Location: [facility] 3 Obstetrics and Gynecology    Anesthesia Start: 1246 Anesthesia Stop: 1410    Procedure: LABOR INDUCTION Diagnosis:       Multigravida of advanced maternal age in third trimester      37 weeks gestation of pregnancy    Scheduled Providers:  Responsible Provider: RUSLAN Guaman    Anesthesia Type: epidural ASA Status: 1            Anesthesia Type: epidural and STAT  which was converted to GA.    Vitals Value Taken Time   BP 93/54 10/31/23 1423   Temp 36.3 10/31/23 1423   Pulse 85 10/31/23 1423   Resp 20 10/31/23 1423   SpO2 97 10/31/23 1423       Anesthesia Post Evaluation    Patient location during evaluation: floor  Patient participation: complete - patient participated  Level of consciousness: lethargic and awake  Pain score: 0  Pain management: adequate  Multimodal analgesia pain management approach  Two or more strategies used to mitigate risk of obstructive sleep apnea  Cardiovascular status: acceptable  Respiratory status: acceptable  Hydration status: hypovolemic  Comments: The patient was extubated once stable in the OR. Epidural was still in place and working so it was bolused for surgical pain control. The patient is to be monitored and evaluated by Neuro d/t the seizure activity. At this time there is no known cause for the seizure. The epidural catheter was removed at the end of the case, tip was intact. The patient's blood pressure is mildly low so she is to continue to receive another LR 500ml fluid bolus. OB doctor covering is ordering lab to access for any indication of seizure.        Encounter Notable Events   Notable Event Outcome Phase Comment   Seizure Treatment: Surgery Intraprocedure Patient had a seizure several hours after epidural was placed and low BP, patient required STAT .

## 2023-10-31 NOTE — DISCHARGE SUMMARY
"Discharge Diagnosis  AMA (advanced maternal age) multigravida 35+, third trimester    [place] [facility] [place] Pending At Discharge  Pending Labs       Order Current Status    Surgical Pathology Exam - PLACENTA Collected (10/31/23 1803)    Blood Culture In process    See - #[id number]; Other-indicate in comment; [id number] - Miscellaneous Test In process    Lactate dehydrogenase In process          Hospital Course   37 yo CF  admitted last evening at 39.2 weeks for elective induction, Pitocin protocol. Unremarkable pregnancy course.  Epidural and AROM morning of 10/31/23. Late morning SVE 5cm. OB staff nurse witnessed patient seizure activity. FHT declined to 60's x 6 min. This provider called from surgery department to help. Patient was urgently delivered by PLTCS, general and regional anesthesia used. Consulted with neurologist on-call, ID specialist for additional advice, and members of the ICU team at [facility] [place] [facility] [place] and other services needed to assist with evaluation and comprehensive acute care.       Pertinent Physical Exam At Time of Discharge  Physical Exam  /52   Pulse 91   Temp 36.4 °C (97.5 °F)   Resp 18   Ht 1.626 m (5' 4\")   Wt 80.3 kg (177 lb)   LMP 2023   SpO2 96%   Breastfeeding Unknown   BMI 30.38 kg/m²    Stable for transfer.  AAOX3.   See Progress note for last exam    Hospital medications initiated:  Keppra IV  Magnesium sulfate IV  S/P Ancef 2g IV  S/P Azithromycin 500mg IV    Home Medications     Medication List      ASK your doctor about these medications     cholecalciferol 25 MCG (1000 UT) tablet; Commonly known as: Vitamin D-3   citalopram 40 mg tablet; Commonly known as: CeleXA   Prenatal 27 mg iron-800 mcg folic acid tablet; Generic drug: prenatal   vitamin (iron-folic)     Outpatient Follow-Up  Future Appointments   Date Time Provider Department Center   2023  9:00 AM Maya M " MD [facility]       Ruth Polo, DO

## 2023-10-31 NOTE — PROGRESS NOTES
Postpartum Progress Note    Assessment/Plan   Genna Garner is a 38 y.o., , who delivered at 39w3d gestation and is now postpartum day 0.    Principal Problem:    AMA (advanced maternal age) multigravida 35+, third trimester  Active Problems:    Anxiety    Depression  S/P Seizure intrapartum  Keppra and magnesium sulfate initiated per Dr. Laird  MR studies and EEG ordered STAT and not completed yet  Spoke with ID - Dr. Peterson, recommended starting Acyclovir IV    Spoke with Dr. Gibbs, Dr. Laird, and Dr Kim - accepted the patient at 1650 for Mac2 critical transfer  Baby transferred to &  Updated her       Pregnancy Problems (from 23 to present)       Problem Noted Resolved    AMA (advanced maternal age) multigravida 35+, third trimester 10/30/2023 by Rabia Humphries MD No    Priority:  Medium             Subjective   Her pain is well controlled with current medications.     Objective     Allergies:   Bee pollen  Neuro: AAOx3; Weakness in all extremities. Tremor is less.  Remainder of exam for Neurologist - just arrived, Dr. Medrano to complete assessment.    : Fundus is firm; dressing is dry  Ext: Moderate non-pitting edema.    Skin: pallor improving      Last Vitals:  Temp Pulse Resp BP MAP Pulse Ox   36.3 °C (97.3 °F) 88 18 120/69   98 %     Vitals Min/Max Last 24 Hours:  Temp  Min: 36.3 °C (97.3 °F)  Max: 37.1 °C (98.8 °F)  Pulse  Min: 51  Max: 100  Resp  Min: 16  Max: 18  BP  Min: 65/37  Max: 149/86    Intake/Output:     Intake/Output Summary (Last 24 hours) at 10/31/2023 1718  Last data filed at 10/31/2023 1501  Gross per 24 hour   Intake 2018.92 ml   Output 1840 ml   Net 178.92 ml         Lab Data:  Lab Results   Component Value Date    WBC 17.7 (H) 10/31/2023    HGB 10.6 (L) 10/31/2023    HCT 32.5 (L) 10/31/2023     (L) 10/31/2023     Lab Results   Component Value Date    GLUCOSE 91 10/31/2023     10/31/2023    K 3.9 10/31/2023     10/31/2023    CO2 20 (L)  "10/31/2023    ANIONGAP 10 10/31/2023    BUN 7 (L) 10/31/2023    CREATININE 0.60 10/31/2023    EGFR >90 10/31/2023    CALCIUM 8.7 10/31/2023    ALBUMIN 3.1 (L) 10/31/2023    PROT 5.5 (L) 10/31/2023    ALKPHOS 104 10/31/2023    ALT 13 10/31/2023    AST 31 10/31/2023    BILITOT 0.3 10/31/2023     Lab Results   Component Value Date    APTT 24.2 10/31/2023    PROTIME 9.7 10/31/2023    INR 0.9 10/31/2023     Lab Results   Component Value Date    FIBRINOGEN 418 (H) 10/31/2023     No results found for: \"LACTATE\"  "

## 2023-10-31 NOTE — L&D DELIVERY NOTE
OB Delivery Note  10/31/2023  Genna Garner  38 y.o.        Gestational Age: 39w3d  /Para:   Quantitative Blood Loss: Admission to Discharge: 1140 mL (10/30/2023  7:49 PM - 10/31/2023  4:21 PM)    Jocy Garner [[id number]]      Labor Events    Rupture date/time: 10/31/2023 0853  Rupture type: Artificial  Fluid color: Clear  Fluid odor: None  Complications: Seizures During Labor       Placenta    Placenta delivery date/time:   Placenta removal:        Anesthesia    Method: Epidural, Combined spinal-epidural       [place] Delivery    Birth date/time: 10/31/2022 13:02:00  Delivery type:   Complications: Seizures During Labor       Resuscitation    Method: Tactile stimulation, Continuous positive airway pressure (CPAP), Suctioning       Apgars    Living status: Living  Apgar Component Scores:  1 min.:  5 min.:  10 min.:  15 min.:  20 min.:    Skin color:  0  1       Heart rate:  2  2       Reflex irritability:  2  2       Muscle tone:  1  2       Respiratory effort:  2  2       Total:  7  9       Apgars assigned by: GIOVANNI WISE       Delivery Providers    Delivering clinician:    Provider Role     Delivery Nurse     Nursery Nurse     Resident               Pre-op diagnosis: NRFHT; Maternal seizure/Postictal; 39 weeks  Postop diagnoses: Same   Procedure:  Primary low-transverse  section with a Pfannenstiel skin incision  Surgeon: Ruth Polo DO  Assist: Deborah Rodriguez RN  Anesthesia: Epidural     Indications: Emergent for NRFHT and maternal seizure of unknown etiology     Description of Procedure:  Following discovery of patient seiuzure activity and FHR at 60 with variability for 6 min, this provider was called out of the OR stat to deliver emergently.  Found the caregiving team and the patient in the OR. The patient is minimally responsive. The patient was placed in the supine position with a leftward tilt. Stat splash prep with providone iodine. James inserted rapidly. The FHR = 138.  The  patient did not receive prophylactic antibiotic after incision.  Sequential compression stockings were applied to her lower extremities. General anesthesia was induced by the anesthesiologist. A Pfannenstiel skin incision was then made with the scalpel and carried through the underlying layer of fascia.  Blunt dissection assisted with exposure.  The superior aspect of the fascial incision was then grasped with Kocher clamps, elevated, and underlying rectus muscles dissected off bluntly.  Attention was then turned to the inferior aspect of this incision which in a similar fashion was grasped, tented up with the Kocher clamps and the rectus muscles dissected off bluntly.  The rectus muscles were then  in the midline, and the peritoneum identified, tented up and entered bluntly.  The bladder blade was then inserted and the thin lower uterine segment incised in transverse fashion with the scalpel superior to the reflection. The bladder blade was removed and the infant's head delivered.  Shoulders and body then delivered and the viable female infant was rested upon mother's abdomen with an adequate cry.  The cord was clamped long and cut.  The infant was handed off to the nurse.  Cord gases and blood were collected.     The placenta was then removed by assisted expression.  The intrauterine cavity was cleared of all clot and debris.  The patient was extubated for the remainder of the closure. The existing epidural was re-dosed and adequate.  The fundus was injected with 10 u of Pitocin for mid-repair atony.  The incision was repaired with 0 Vicryl in a running, locked fashion.  Second layer of the same suture was used to obtain excellent hemostasis.  The bladder flap reapproximated well.     Hemostasis was assured.  The gutters were cleared of all clots following irrigation.   The fascia was reapproximated with 0 vicryl suture in a running fashion.  The dead space was reduced with interrupted sutures of 3 0  plain.  The skin was then closed in a subcuticular fashion with 4 0 monocryl.  Half-inch Steri-Strips were applied to the incision followed by an occlusive dressing.     Sponge lap needle and instrument counts were correct x2.  The patient was taken to the recovery room in stable condition.     Ruth Polo DO

## 2023-11-01 ENCOUNTER — HOSPITAL ENCOUNTER (INPATIENT)
Facility: HOSPITAL | Age: 38
LOS: 2 days | Discharge: HOME | DRG: 776 | End: 2023-11-03
Attending: STUDENT IN AN ORGANIZED HEALTH CARE EDUCATION/TRAINING PROGRAM | Admitting: STUDENT IN AN ORGANIZED HEALTH CARE EDUCATION/TRAINING PROGRAM
Payer: COMMERCIAL

## 2023-11-01 ENCOUNTER — APPOINTMENT (OUTPATIENT)
Dept: NEUROLOGY | Facility: HOSPITAL | Age: 38
End: 2023-11-01
Payer: COMMERCIAL

## 2023-11-01 VITALS
SYSTOLIC BLOOD PRESSURE: 117 MMHG | DIASTOLIC BLOOD PRESSURE: 60 MMHG | HEART RATE: 98 BPM | RESPIRATION RATE: 16 BRPM | TEMPERATURE: 98.1 F | HEIGHT: 64 IN | WEIGHT: 177 LBS | OXYGEN SATURATION: 98 % | BODY MASS INDEX: 30.22 KG/M2

## 2023-11-01 PROBLEM — O14.13 SEVERE PREECLAMPSIA, THIRD TRIMESTER (HHS-HCC): Status: ACTIVE | Noted: 2023-11-01

## 2023-11-01 LAB
ALBUMIN SERPL BCP-MCNC: 2.4 G/DL (ref 3.4–5)
ALBUMIN SERPL BCP-MCNC: 2.8 G/DL (ref 3.4–5)
ALP SERPL-CCNC: 77 U/L (ref 33–110)
ALP SERPL-CCNC: 84 U/L (ref 33–110)
ALT SERPL W P-5'-P-CCNC: 10 U/L (ref 7–45)
ALT SERPL W P-5'-P-CCNC: 13 U/L (ref 7–45)
ANION GAP SERPL CALC-SCNC: 14 MMOL/L (ref 10–20)
ANION GAP SERPL CALC-SCNC: 16 MMOL/L (ref 10–20)
APTT PPP: 17 SECONDS (ref 27–38)
APTT PPP: 22 SECONDS (ref 27–38)
AST SERPL W P-5'-P-CCNC: 29 U/L (ref 9–39)
AST SERPL W P-5'-P-CCNC: 31 U/L (ref 9–39)
BILIRUB SERPL-MCNC: 0.2 MG/DL (ref 0–1.2)
BILIRUB SERPL-MCNC: 0.3 MG/DL (ref 0–1.2)
BUN SERPL-MCNC: 7 MG/DL (ref 6–23)
BUN SERPL-MCNC: 8 MG/DL (ref 6–23)
CALCIUM SERPL-MCNC: 6.9 MG/DL (ref 8.6–10.6)
CALCIUM SERPL-MCNC: 7.2 MG/DL (ref 8.6–10.6)
CHLORIDE SERPL-SCNC: 104 MMOL/L (ref 98–107)
CHLORIDE SERPL-SCNC: 105 MMOL/L (ref 98–107)
CO2 SERPL-SCNC: 19 MMOL/L (ref 21–32)
CO2 SERPL-SCNC: 24 MMOL/L (ref 21–32)
CREAT SERPL-MCNC: 0.57 MG/DL (ref 0.5–1.05)
CREAT SERPL-MCNC: 0.67 MG/DL (ref 0.5–1.05)
ERYTHROCYTE [DISTWIDTH] IN BLOOD BY AUTOMATED COUNT: 13.6 % (ref 11.5–14.5)
ERYTHROCYTE [DISTWIDTH] IN BLOOD BY AUTOMATED COUNT: 14.2 % (ref 11.5–14.5)
ERYTHROCYTE [DISTWIDTH] IN BLOOD BY AUTOMATED COUNT: 14.2 % (ref 11.5–14.5)
FIBRINOGEN PPP-MCNC: 462 MG/DL (ref 200–400)
FIBRINOGEN PPP-MCNC: 478 MG/DL (ref 200–400)
GFR SERPL CREATININE-BSD FRML MDRD: >90 ML/MIN/1.73M*2
GFR SERPL CREATININE-BSD FRML MDRD: >90 ML/MIN/1.73M*2
GLUCOSE SERPL-MCNC: 107 MG/DL (ref 74–99)
GLUCOSE SERPL-MCNC: 111 MG/DL (ref 74–99)
HCT VFR BLD AUTO: 18.2 % (ref 36–46)
HCT VFR BLD AUTO: 25.3 % (ref 36–46)
HCT VFR BLD AUTO: 27.6 % (ref 36–46)
HGB BLD-MCNC: 5.4 G/DL (ref 12–16)
HGB BLD-MCNC: 8.2 G/DL (ref 12–16)
HGB BLD-MCNC: 8.7 G/DL (ref 12–16)
INR PPP: 0.8 (ref 0.9–1.1)
INR PPP: 0.9 (ref 0.9–1.1)
MCH RBC QN AUTO: 31.8 PG (ref 26–34)
MCH RBC QN AUTO: 32 PG (ref 26–34)
MCH RBC QN AUTO: 32.5 PG (ref 26–34)
MCHC RBC AUTO-ENTMCNC: 29.7 G/DL (ref 32–36)
MCHC RBC AUTO-ENTMCNC: 31.5 G/DL (ref 32–36)
MCHC RBC AUTO-ENTMCNC: 32.4 G/DL (ref 32–36)
MCV RBC AUTO: 100 FL (ref 80–100)
MCV RBC AUTO: 102 FL (ref 80–100)
MCV RBC AUTO: 107 FL (ref 80–100)
NRBC BLD-RTO: 0 /100 WBCS (ref 0–0)
PLATELET # BLD AUTO: 149 X10*3/UL (ref 150–450)
PLATELET # BLD AUTO: 155 X10*3/UL (ref 150–450)
PLATELET # BLD AUTO: 64 X10*3/UL (ref 150–450)
PMV BLD AUTO: 13 FL (ref 7.5–11.5)
PMV BLD AUTO: 13.4 FL (ref 7.5–11.5)
POTASSIUM SERPL-SCNC: 3.7 MMOL/L (ref 3.5–5.3)
POTASSIUM SERPL-SCNC: 4.2 MMOL/L (ref 3.5–5.3)
PROT SERPL-MCNC: 4.6 G/DL (ref 6.4–8.2)
PROT SERPL-MCNC: 5 G/DL (ref 6.4–8.2)
PROTHROMBIN TIME: 9.4 SECONDS (ref 9.8–12.8)
PROTHROMBIN TIME: 9.6 SECONDS (ref 9.8–12.8)
RBC # BLD AUTO: 1.7 X10*6/UL (ref 4–5.2)
RBC # BLD AUTO: 2.52 X10*6/UL (ref 4–5.2)
RBC # BLD AUTO: 2.72 X10*6/UL (ref 4–5.2)
SODIUM SERPL-SCNC: 135 MMOL/L (ref 136–145)
SODIUM SERPL-SCNC: 139 MMOL/L (ref 136–145)
WBC # BLD AUTO: 12.1 X10*3/UL (ref 4.4–11.3)
WBC # BLD AUTO: 13.4 X10*3/UL (ref 4.4–11.3)
WBC # BLD AUTO: 8.9 X10*3/UL (ref 4.4–11.3)

## 2023-11-01 PROCEDURE — 85027 COMPLETE CBC AUTOMATED: CPT | Performed by: STUDENT IN AN ORGANIZED HEALTH CARE EDUCATION/TRAINING PROGRAM

## 2023-11-01 PROCEDURE — 95715 VEEG EA 12-26HR INTMT MNTR: CPT | Performed by: PSYCHIATRY & NEUROLOGY

## 2023-11-01 PROCEDURE — 85384 FIBRINOGEN ACTIVITY: CPT | Performed by: STUDENT IN AN ORGANIZED HEALTH CARE EDUCATION/TRAINING PROGRAM

## 2023-11-01 PROCEDURE — 2550000001 HC RX 255 CONTRASTS: Performed by: STUDENT IN AN ORGANIZED HEALTH CARE EDUCATION/TRAINING PROGRAM

## 2023-11-01 PROCEDURE — 36415 COLL VENOUS BLD VENIPUNCTURE: CPT | Performed by: STUDENT IN AN ORGANIZED HEALTH CARE EDUCATION/TRAINING PROGRAM

## 2023-11-01 PROCEDURE — 80053 COMPREHEN METABOLIC PANEL: CPT | Performed by: STUDENT IN AN ORGANIZED HEALTH CARE EDUCATION/TRAINING PROGRAM

## 2023-11-01 PROCEDURE — A9575 INJ GADOTERATE MEGLUMI 0.1ML: HCPCS | Performed by: STUDENT IN AN ORGANIZED HEALTH CARE EDUCATION/TRAINING PROGRAM

## 2023-11-01 PROCEDURE — 95700 EEG CONT REC W/VID EEG TECH: CPT

## 2023-11-01 PROCEDURE — 95715 VEEG EA 12-26HR INTMT MNTR: CPT

## 2023-11-01 PROCEDURE — 85730 THROMBOPLASTIN TIME PARTIAL: CPT | Performed by: STUDENT IN AN ORGANIZED HEALTH CARE EDUCATION/TRAINING PROGRAM

## 2023-11-01 PROCEDURE — 2500000004 HC RX 250 GENERAL PHARMACY W/ HCPCS (ALT 636 FOR OP/ED): Performed by: STUDENT IN AN ORGANIZED HEALTH CARE EDUCATION/TRAINING PROGRAM

## 2023-11-01 PROCEDURE — 2500000001 HC RX 250 WO HCPCS SELF ADMINISTERED DRUGS (ALT 637 FOR MEDICARE OP): Performed by: STUDENT IN AN ORGANIZED HEALTH CARE EDUCATION/TRAINING PROGRAM

## 2023-11-01 PROCEDURE — 99199 UNLISTED SPECIAL SVC PX/RPRT: CPT

## 2023-11-01 PROCEDURE — 70553 MRI BRAIN STEM W/O & W/DYE: CPT | Performed by: RADIOLOGY

## 2023-11-01 PROCEDURE — 70547 MR ANGIOGRAPHY NECK W/O DYE: CPT | Performed by: RADIOLOGY

## 2023-11-01 PROCEDURE — 95720 EEG PHY/QHP EA INCR W/VEEG: CPT | Performed by: PSYCHIATRY & NEUROLOGY

## 2023-11-01 PROCEDURE — 70544 MR ANGIOGRAPHY HEAD W/O DYE: CPT | Performed by: RADIOLOGY

## 2023-11-01 PROCEDURE — 85610 PROTHROMBIN TIME: CPT | Performed by: STUDENT IN AN ORGANIZED HEALTH CARE EDUCATION/TRAINING PROGRAM

## 2023-11-01 PROCEDURE — 2500000005 HC RX 250 GENERAL PHARMACY W/O HCPCS: Performed by: STUDENT IN AN ORGANIZED HEALTH CARE EDUCATION/TRAINING PROGRAM

## 2023-11-01 PROCEDURE — 95700 EEG CONT REC W/VID EEG TECH: CPT | Performed by: PSYCHIATRY & NEUROLOGY

## 2023-11-01 PROCEDURE — 1210000001 HC SEMI-PRIVATE ROOM DAILY

## 2023-11-01 RX ORDER — OXYTOCIN 10 [USP'U]/ML
10 INJECTION, SOLUTION INTRAMUSCULAR; INTRAVENOUS ONCE AS NEEDED
Status: DISCONTINUED | OUTPATIENT
Start: 2023-11-01 | End: 2023-11-03 | Stop reason: HOSPADM

## 2023-11-01 RX ORDER — DIPHENHYDRAMINE HCL 25 MG
25 CAPSULE ORAL EVERY 4 HOURS PRN
Status: DISCONTINUED | OUTPATIENT
Start: 2023-11-01 | End: 2023-11-03 | Stop reason: HOSPADM

## 2023-11-01 RX ORDER — CITALOPRAM 40 MG/1
80 TABLET, FILM COATED ORAL DAILY
Status: DISCONTINUED | OUTPATIENT
Start: 2023-11-01 | End: 2023-11-03 | Stop reason: HOSPADM

## 2023-11-01 RX ORDER — OXYTOCIN/0.9 % SODIUM CHLORIDE 30/500 ML
60 PLASTIC BAG, INJECTION (ML) INTRAVENOUS ONCE AS NEEDED
Status: DISCONTINUED | OUTPATIENT
Start: 2023-11-01 | End: 2023-11-03 | Stop reason: HOSPADM

## 2023-11-01 RX ORDER — MISOPROSTOL 200 UG/1
800 TABLET ORAL ONCE AS NEEDED
Status: DISCONTINUED | OUTPATIENT
Start: 2023-11-01 | End: 2023-11-03 | Stop reason: HOSPADM

## 2023-11-01 RX ORDER — ADHESIVE BANDAGE
10 BANDAGE TOPICAL
Status: DISCONTINUED | OUTPATIENT
Start: 2023-11-01 | End: 2023-11-03 | Stop reason: HOSPADM

## 2023-11-01 RX ORDER — ONDANSETRON HYDROCHLORIDE 2 MG/ML
4 INJECTION, SOLUTION INTRAVENOUS EVERY 6 HOURS PRN
Status: DISCONTINUED | OUTPATIENT
Start: 2023-11-01 | End: 2023-11-03 | Stop reason: HOSPADM

## 2023-11-01 RX ORDER — ONDANSETRON 4 MG/1
4 TABLET, FILM COATED ORAL EVERY 6 HOURS PRN
Status: DISCONTINUED | OUTPATIENT
Start: 2023-11-01 | End: 2023-11-03 | Stop reason: HOSPADM

## 2023-11-01 RX ORDER — LIDOCAINE 560 MG/1
1 PATCH PERCUTANEOUS; TOPICAL; TRANSDERMAL
Status: DISCONTINUED | OUTPATIENT
Start: 2023-11-01 | End: 2023-11-03 | Stop reason: HOSPADM

## 2023-11-01 RX ORDER — HYDROMORPHONE HYDROCHLORIDE 1 MG/ML
0.2 INJECTION, SOLUTION INTRAMUSCULAR; INTRAVENOUS; SUBCUTANEOUS EVERY 5 MIN PRN
Status: DISCONTINUED | OUTPATIENT
Start: 2023-11-01 | End: 2023-11-03 | Stop reason: HOSPADM

## 2023-11-01 RX ORDER — METHYLERGONOVINE MALEATE 0.2 MG/ML
0.2 INJECTION INTRAVENOUS ONCE AS NEEDED
Status: DISCONTINUED | OUTPATIENT
Start: 2023-11-01 | End: 2023-11-03 | Stop reason: HOSPADM

## 2023-11-01 RX ORDER — ACETAMINOPHEN 325 MG/1
975 TABLET ORAL EVERY 6 HOURS
Status: DISCONTINUED | OUTPATIENT
Start: 2023-11-01 | End: 2023-11-03 | Stop reason: HOSPADM

## 2023-11-01 RX ORDER — KETOROLAC TROMETHAMINE 30 MG/ML
30 INJECTION, SOLUTION INTRAMUSCULAR; INTRAVENOUS EVERY 6 HOURS
Status: DISCONTINUED | OUTPATIENT
Start: 2023-11-01 | End: 2023-11-01

## 2023-11-01 RX ORDER — NALOXONE HYDROCHLORIDE 0.4 MG/ML
0.1 INJECTION, SOLUTION INTRAMUSCULAR; INTRAVENOUS; SUBCUTANEOUS EVERY 5 MIN PRN
Status: DISCONTINUED | OUTPATIENT
Start: 2023-11-01 | End: 2023-11-03 | Stop reason: HOSPADM

## 2023-11-01 RX ORDER — FERROUS SULFATE 325(65) MG
65 TABLET ORAL
Status: DISCONTINUED | OUTPATIENT
Start: 2023-11-01 | End: 2023-11-01 | Stop reason: HOSPADM

## 2023-11-01 RX ORDER — ENOXAPARIN SODIUM 100 MG/ML
40 INJECTION SUBCUTANEOUS EVERY 24 HOURS
Status: DISCONTINUED | OUTPATIENT
Start: 2023-11-02 | End: 2023-11-03 | Stop reason: HOSPADM

## 2023-11-01 RX ORDER — OXYCODONE HYDROCHLORIDE 5 MG/1
5 TABLET ORAL EVERY 4 HOURS PRN
Status: DISCONTINUED | OUTPATIENT
Start: 2023-11-02 | End: 2023-11-03 | Stop reason: HOSPADM

## 2023-11-01 RX ORDER — SIMETHICONE 80 MG
80 TABLET,CHEWABLE ORAL 4 TIMES DAILY PRN
Status: DISCONTINUED | OUTPATIENT
Start: 2023-11-01 | End: 2023-11-03 | Stop reason: HOSPADM

## 2023-11-01 RX ORDER — IBUPROFEN 600 MG/1
600 TABLET ORAL EVERY 6 HOURS
Status: DISCONTINUED | OUTPATIENT
Start: 2023-11-02 | End: 2023-11-01

## 2023-11-01 RX ORDER — MAGNESIUM SULFATE HEPTAHYDRATE 40 MG/ML
2 INJECTION, SOLUTION INTRAVENOUS CONTINUOUS
Status: DISCONTINUED | OUTPATIENT
Start: 2023-11-01 | End: 2023-11-03 | Stop reason: HOSPADM

## 2023-11-01 RX ORDER — HYDRALAZINE HYDROCHLORIDE 20 MG/ML
5 INJECTION INTRAMUSCULAR; INTRAVENOUS ONCE AS NEEDED
Status: DISCONTINUED | OUTPATIENT
Start: 2023-11-01 | End: 2023-11-03 | Stop reason: HOSPADM

## 2023-11-01 RX ORDER — GADOTERATE MEGLUMINE 376.9 MG/ML
20 INJECTION INTRAVENOUS
Status: COMPLETED | OUTPATIENT
Start: 2023-11-01 | End: 2023-10-31

## 2023-11-01 RX ORDER — NIFEDIPINE 10 MG/1
10 CAPSULE ORAL ONCE AS NEEDED
Status: DISCONTINUED | OUTPATIENT
Start: 2023-11-01 | End: 2023-11-03 | Stop reason: HOSPADM

## 2023-11-01 RX ORDER — TRANEXAMIC ACID 100 MG/ML
1000 INJECTION, SOLUTION INTRAVENOUS ONCE AS NEEDED
Status: DISCONTINUED | OUTPATIENT
Start: 2023-11-01 | End: 2023-11-03 | Stop reason: HOSPADM

## 2023-11-01 RX ORDER — DIPHENHYDRAMINE HYDROCHLORIDE 50 MG/ML
25 INJECTION INTRAMUSCULAR; INTRAVENOUS EVERY 4 HOURS PRN
Status: DISCONTINUED | OUTPATIENT
Start: 2023-11-01 | End: 2023-11-03 | Stop reason: HOSPADM

## 2023-11-01 RX ORDER — LABETALOL HYDROCHLORIDE 5 MG/ML
20 INJECTION, SOLUTION INTRAVENOUS ONCE AS NEEDED
Status: DISCONTINUED | OUTPATIENT
Start: 2023-11-01 | End: 2023-11-03 | Stop reason: HOSPADM

## 2023-11-01 RX ORDER — IBUPROFEN 600 MG/1
600 TABLET ORAL EVERY 6 HOURS
Status: DISCONTINUED | OUTPATIENT
Start: 2023-11-01 | End: 2023-11-03 | Stop reason: HOSPADM

## 2023-11-01 RX ORDER — POLYETHYLENE GLYCOL 3350 17 G/17G
17 POWDER, FOR SOLUTION ORAL 2 TIMES DAILY PRN
Status: DISCONTINUED | OUTPATIENT
Start: 2023-11-01 | End: 2023-11-03 | Stop reason: HOSPADM

## 2023-11-01 RX ORDER — BISACODYL 10 MG/1
10 SUPPOSITORY RECTAL DAILY PRN
Status: DISCONTINUED | OUTPATIENT
Start: 2023-11-01 | End: 2023-11-03 | Stop reason: HOSPADM

## 2023-11-01 RX ORDER — CARBOPROST TROMETHAMINE 250 UG/ML
250 INJECTION, SOLUTION INTRAMUSCULAR ONCE AS NEEDED
Status: DISCONTINUED | OUTPATIENT
Start: 2023-11-01 | End: 2023-11-03 | Stop reason: HOSPADM

## 2023-11-01 RX ORDER — LOPERAMIDE HYDROCHLORIDE 2 MG/1
4 CAPSULE ORAL EVERY 2 HOUR PRN
Status: DISCONTINUED | OUTPATIENT
Start: 2023-11-01 | End: 2023-11-03 | Stop reason: HOSPADM

## 2023-11-01 RX ORDER — NALBUPHINE HYDROCHLORIDE 10 MG/ML
5 INJECTION, SOLUTION INTRAMUSCULAR; INTRAVENOUS; SUBCUTANEOUS
Status: ACTIVE | OUTPATIENT
Start: 2023-11-01 | End: 2023-11-02

## 2023-11-01 RX ORDER — OXYCODONE HYDROCHLORIDE 5 MG/1
10 TABLET ORAL EVERY 4 HOURS PRN
Status: DISCONTINUED | OUTPATIENT
Start: 2023-11-02 | End: 2023-11-03 | Stop reason: HOSPADM

## 2023-11-01 RX ORDER — CALCIUM GLUCONATE 98 MG/ML
1 INJECTION, SOLUTION INTRAVENOUS ONCE AS NEEDED
Status: DISCONTINUED | OUTPATIENT
Start: 2023-11-01 | End: 2023-11-03 | Stop reason: HOSPADM

## 2023-11-01 RX ADMIN — CITALOPRAM HYDROBROMIDE 80 MG: 40 TABLET ORAL at 17:57

## 2023-11-01 RX ADMIN — ACETAMINOPHEN 975 MG: 325 TABLET ORAL at 20:06

## 2023-11-01 RX ADMIN — ACYCLOVIR SODIUM 800 MG: 50 INJECTION, SOLUTION INTRAVENOUS at 04:19

## 2023-11-01 RX ADMIN — ACETAMINOPHEN 975 MG: 325 TABLET ORAL at 14:53

## 2023-11-01 RX ADMIN — ACETAMINOPHEN 975 MG: 325 TABLET ORAL at 02:20

## 2023-11-01 RX ADMIN — LEVETIRACETAM 500 MG: 500 TABLET, FILM COATED ORAL at 10:32

## 2023-11-01 RX ADMIN — KETOROLAC TROMETHAMINE 30 MG: 30 INJECTION, SOLUTION INTRAMUSCULAR; INTRAVENOUS at 02:21

## 2023-11-01 RX ADMIN — ACETAMINOPHEN 975 MG: 325 TABLET ORAL at 08:25

## 2023-11-01 RX ADMIN — IBUPROFEN 600 MG: 600 TABLET ORAL at 20:06

## 2023-11-01 RX ADMIN — MAGNESIUM SULFATE HEPTAHYDRATE 2 G/HR: 40 INJECTION, SOLUTION INTRAVENOUS at 11:37

## 2023-11-01 RX ADMIN — IBUPROFEN 600 MG: 600 TABLET ORAL at 14:57

## 2023-11-01 RX ADMIN — LIDOCAINE 1 PATCH: 4 PATCH TOPICAL at 19:50

## 2023-11-01 RX ADMIN — MAGNESIUM SULFATE HEPTAHYDRATE 2 G/HR: 40 INJECTION, SOLUTION INTRAVENOUS at 01:08

## 2023-11-01 RX ADMIN — KETOROLAC TROMETHAMINE 30 MG: 30 INJECTION, SOLUTION INTRAMUSCULAR; INTRAVENOUS at 08:25

## 2023-11-01 SDOH — HEALTH STABILITY: MENTAL HEALTH: SUICIDAL BEHAVIOR (LIFETIME): NO

## 2023-11-01 SDOH — SOCIAL STABILITY: SOCIAL INSECURITY: HAS ANYONE EVER THREATENED TO HURT YOUR FAMILY OR YOUR PETS?: NO

## 2023-11-01 SDOH — SOCIAL STABILITY: SOCIAL INSECURITY: ARE THERE ANY APPARENT SIGNS OF INJURIES/BEHAVIORS THAT COULD BE RELATED TO ABUSE/NEGLECT?: NO

## 2023-11-01 SDOH — SOCIAL STABILITY: SOCIAL INSECURITY: PHYSICAL ABUSE: DENIES

## 2023-11-01 SDOH — SOCIAL STABILITY: SOCIAL INSECURITY: DOES ANYONE TRY TO KEEP YOU FROM HAVING/CONTACTING OTHER FRIENDS OR DOING THINGS OUTSIDE YOUR HOME?: NO

## 2023-11-01 SDOH — SOCIAL STABILITY: SOCIAL INSECURITY: DO YOU FEEL ANYONE HAS EXPLOITED OR TAKEN ADVANTAGE OF YOU FINANCIALLY OR OF YOUR PERSONAL PROPERTY?: NO

## 2023-11-01 SDOH — HEALTH STABILITY: MENTAL HEALTH: HAVE YOU USED ANY PRESCRIPTION DRUGS OTHER THAN PRESCRIBED IN THE PAST 12 MONTHS?: NO

## 2023-11-01 SDOH — SOCIAL STABILITY: SOCIAL INSECURITY: VERBAL ABUSE: DENIES

## 2023-11-01 SDOH — SOCIAL STABILITY: SOCIAL INSECURITY: ARE YOU OR HAVE YOU BEEN THREATENED OR ABUSED PHYSICALLY, EMOTIONALLY, OR SEXUALLY BY ANYONE?: NO

## 2023-11-01 SDOH — SOCIAL STABILITY: SOCIAL INSECURITY: HAVE YOU HAD THOUGHTS OF HARMING ANYONE ELSE?: NO

## 2023-11-01 SDOH — HEALTH STABILITY: MENTAL HEALTH: WERE YOU ABLE TO COMPLETE ALL THE BEHAVIORAL HEALTH SCREENINGS?: YES

## 2023-11-01 SDOH — HEALTH STABILITY: MENTAL HEALTH: HAVE YOU USED ANY SUBSTANCES (CANABIS, COCAINE, HEROIN, HALLUCINOGENS, INHALANTS, ETC.) IN THE PAST 12 MONTHS?: NO

## 2023-11-01 SDOH — HEALTH STABILITY: MENTAL HEALTH: NON-SPECIFIC ACTIVE SUICIDAL THOUGHTS (PAST 1 MONTH): NO

## 2023-11-01 SDOH — HEALTH STABILITY: MENTAL HEALTH: WISH TO BE DEAD (PAST 1 MONTH): NO

## 2023-11-01 SDOH — ECONOMIC STABILITY: HOUSING INSECURITY: DO YOU FEEL UNSAFE GOING BACK TO THE PLACE WHERE YOU ARE LIVING?: NO

## 2023-11-01 SDOH — SOCIAL STABILITY: SOCIAL INSECURITY: ABUSE SCREEN: ADULT

## 2023-11-01 ASSESSMENT — PAIN DESCRIPTION - DESCRIPTORS
DESCRIPTORS: SORE;CRAMPING
DESCRIPTORS: DISCOMFORT
DESCRIPTORS: SORE;CRAMPING
DESCRIPTORS: DISCOMFORT

## 2023-11-01 ASSESSMENT — PAIN SCALES - GENERAL
PAINLEVEL_OUTOF10: 4
PAINLEVEL_OUTOF10: 4
PAINLEVEL_OUTOF10: 3
PAINLEVEL_OUTOF10: 3
PAINLEVEL_OUTOF10: 0 - NO PAIN
PAINLEVEL_OUTOF10: 0 - NO PAIN
PAINLEVEL_OUTOF10: 8
PAINLEVEL_OUTOF10: 2
PAINLEVEL_OUTOF10: 3
PAINLEVEL_OUTOF10: 2
PAINLEVEL_OUTOF10: 3
PAINLEVEL_OUTOF10: 3
PAINLEVEL_OUTOF10: 4
PAINLEVEL_OUTOF10: 3
PAINLEVEL_OUTOF10: 2
PAINLEVEL_OUTOF10: 3
PAINLEVEL_OUTOF10: 0 - NO PAIN
PAINLEVEL_OUTOF10: 4
PAINLEVEL_OUTOF10: 4
PAINLEVEL_OUTOF10: 0 - NO PAIN
PAINLEVEL_OUTOF10: 0 - NO PAIN
PAINLEVEL_OUTOF10: 4

## 2023-11-01 ASSESSMENT — LIFESTYLE VARIABLES
SKIP TO QUESTIONS 9-10: 1
AUDIT-C TOTAL SCORE: 0
HOW OFTEN DO YOU HAVE A DRINK CONTAINING ALCOHOL: NEVER
HOW OFTEN DO YOU HAVE 6 OR MORE DRINKS ON ONE OCCASION: NEVER
AUDIT-C TOTAL SCORE: 0
HOW MANY STANDARD DRINKS CONTAINING ALCOHOL DO YOU HAVE ON A TYPICAL DAY: PATIENT DOES NOT DRINK

## 2023-11-01 ASSESSMENT — ACTIVITIES OF DAILY LIVING (ADL): LACK_OF_TRANSPORTATION: NO

## 2023-11-01 NOTE — NURSING NOTE
Patient charting for 1135 and previous is unable to be edited due to patient being discharged and readmitted. All of the admission documents/ requirements are completed.

## 2023-11-01 NOTE — NURSING NOTE
When patient's chart was being adjusted patient's medications were discontinued. RN unable to chart on LR pumps for hourly magnesium checks. From 1230 LR was 58mL, from 1345 LR was 93mL and from 1500 LR was 94. Oncoming RN made aware of LR outputs not being put into I/Os.

## 2023-11-01 NOTE — H&P
Obstetrical Admission History and Physical    Assessment/Plan    Genna Garner is a 38 y.o.  at 39w3d admitted for seizure of unknown etiology    Intrapartum seizure  Patient with witnessed seizure during induction, patient unresponsive with eyes rolled back, rigid limbs  No history of seizure disorder, family history of seizures, HTN disorder of pregnancy prior to this. Denies all symptoms of pre-eclampsia  On chart review, patient with multiple mild range BPs during induction, not 4 hours apart, not meeting criteria for HTN disorder of pregnancy  Labs notable for platelets 140 --> 133, otherwise wnl  Neurologic exam wnl, cardiopulmonary exam wnl, vitals currently normal  Differential at this time includes eclampsia, epilepsy, infectious encephalitis, PNES, brain lesion, among others Most likely diagnosis at this time is eclampsia given mild range BP and low platelets, although primary neurologic cause not excluded  Neurology consulted, appreciate recommendations  Plan:  Continue seizure precautions  HELLP labs pending on admission  Continue magnesium 2g/hr for seizure prophylaxis in setting of possible eclampsia  Continue acyclovir 10mg/kg q8hrs for HSV encephalitis prophylaxis  Keppra 500mg BID  MRI/MRA/MRV with and without contrast pending  Video EEG ordered  LP planned per neurology for     Postpartum care  S/p urgent primary CS in setting of seizure with fetal bradycardia  Continue routine postpartum care  Breastfeeding/pumping, lactation consultation PRN    Anxiety/depression  Continue home celexa    Pregnancy notables      Problem List       * (Principal)  delivery delivered    Female infertility due to ovulatory disorder    Irregular menstrual cycle    Small for gestational age fetus affecting management of mother in almaraz pregnancy in third trimester    AMA (advanced maternal age) multigravida 35+, third trimester    Anxiety    Depression          Subjective   Patient presents as a  transfer from [facility] after urgent pLTCS on 10/31 in the setting of intrapartum maternal seizure. At approximately 1230pm, patient was found to be unresponsive, eyes closed and rolled back, mouth open, and limbs rigid for about 30 seconds. Had postictal state in which she was disoriented and confused. Fetal bradycardia was noted for 6 minutes and decision was made to proceed with urgent CS, which was uncomplicated, EBL 1140mL.     Patient states that she remembers seeing flashing white lights and then does not remember anything until waking up after her CS. States she does not remember having a headache. Currently denies headache, vision changes, RUQ pain, chest pain, SOB, numbness, tingling, weakness. Having incisional pain and feeling very tired, otherwise asymptomatic. Had an uncomplicated pregnancy prior to this, denies any personal or family history of HTN, seizure disorder, neurologic problems. Denies history of STIs, cold sores, HSV. Denies substance use. Prior delivery was 38wk  only complicated by FGR.    Obstetrical History   OB History    Para Term  AB Living   3 2 2   1 2   SAB IAB Ectopic Multiple Live Births   1     0 2      # Outcome Date GA Lbr Bishop/2nd Weight Sex Delivery Anes PTL Lv   3 Term 10/31/23 39w3d  2850 g M CS-LTranv EPI, Gen  JIM      Complications: Seizures During Labor, Non-reassuring fetal heart tones, delivered, current hospitalization   2 SAB 22           1 Term 20   2296 g M Vag-Spont  N JIM       Past Medical History  Past Medical History:   Diagnosis Date    Allergy status to unspecified drugs, medicaments and biological substances     History of seasonal allergies   Anxiety/depression     Past Surgical History   Past Surgical History:   Procedure Laterality Date    OTHER SURGICAL HISTORY  2019    No history of surgery   CSx1    Social History  Social History     Tobacco Use    Smoking status: Never    Smokeless tobacco: Never   Substance Use  Topics    Alcohol use: Not Currently     Substance and Sexual Activity   Drug Use Never       Allergies  Bee pollen     Medications  Medications Prior to Admission   Medication Sig Dispense Refill Last Dose    cholecalciferol (Vitamin D-3) 25 MCG (1000 UT) tablet Take by mouth.       citalopram (CeleXA) 40 mg tablet Take 2 tablets (80 mg) by mouth once daily.       prenatal vitamin, iron-folic, (Prenatal) 27 mg iron-800 mcg folic acid tablet Take 1 tablet by mouth.          Objective    Last Vitals  Temp Pulse Resp BP MAP O2 Sat   37.2 °C (99 °F) 97   129/71   96 %     Physical Examination  General: no acute distress  HEENT: normocephalic, atraumatic  Heart: warm and well perfused, normal rate and regular rhythm  Lungs: breathing comfortably on room air, CTAB  Abdomen: soft, appropriately tender to palpation, fundus firm below umbilicus, no rebound/guarding. Dressing in place without strikethrough  Extremities: moving all extremities  Neuro: awake and conversant, but groggy. CN II-XII intact. Sensation to light touch and sharp intact over bilateral upper and lower extremities. Reflexes 1+  Psych: appropriate mood and affect        Lab Review  Labs in chart were reviewed.

## 2023-11-01 NOTE — SIGNIFICANT EVENT
Per neuro, can discontinue acyclovir at this time. Order discontinued.  EDY Love notified.    Angela Wagner MD  OBGYN Attending

## 2023-11-01 NOTE — PROGRESS NOTES
Genna Garner is a 38 y.o. female on day 0 of admission presenting with No Principal Problem: There is no principal problem currently on the Problem List. Please update the Problem List and refresh..      Subjective   Pt seen today, awake and interacting appropriately although tired. No epileptic events since initial consult.     Objective     Last Recorded Vitals  Pulse 95, last menstrual period 01/28/2023, SpO2 98 %, unknown if currently breastfeeding.    Physical Exam  Neurological Exam  AO x3  No cranial nerve or motor deficit grossly.  Reflexes 3+ diffusely, no clonus in BLE.      Results for orders placed or performed during the hospital encounter of 10/31/23 (from the past 24 hour(s))   Comprehensive Metabolic Panel   Result Value Ref Range    Glucose 87 74 - 99 mg/dL    Sodium 126 (L) 136 - 145 mmol/L    Potassium 3.6 3.5 - 5.3 mmol/L    Chloride 98 98 - 107 mmol/L    Bicarbonate 18 (L) 21 - 32 mmol/L    Anion Gap 14 10 - 20 mmol/L    Urea Nitrogen 5 (L) 6 - 23 mg/dL    Creatinine 0.45 (L) 0.50 - 1.05 mg/dL    eGFR >90 >60 mL/min/1.73m*2    Calcium 8.5 (L) 8.6 - 10.6 mg/dL    Albumin 2.3 (L) 3.4 - 5.0 g/dL    Alkaline Phosphatase 79 33 - 110 U/L    Total Protein 3.9 (L) 6.4 - 8.2 g/dL    AST 28 9 - 39 U/L    Bilirubin, Total 0.3 0.0 - 1.2 mg/dL    ALT 11 7 - 45 U/L   Lactate Dehydrogenase   Result Value Ref Range     84 - 246 U/L   CBC and Auto Differential   Result Value Ref Range    WBC 14.1 (H) 4.4 - 11.3 x10*3/uL    nRBC 0.0 0.0 - 0.0 /100 WBCs    RBC 2.41 (L) 4.00 - 5.20 x10*6/uL    Hemoglobin 7.6 (L) 12.0 - 16.0 g/dL    Hematocrit 24.4 (L) 36.0 - 46.0 %     (H) 80 - 100 fL    MCH 31.5 26.0 - 34.0 pg    MCHC 31.1 (L) 32.0 - 36.0 g/dL    RDW 13.3 11.5 - 14.5 %    Platelets 104 (L) 150 - 450 x10*3/uL    MPV 13.2 (H) 7.5 - 11.5 fL    Neutrophils % 90.4 40.0 - 80.0 %    Immature Granulocytes %, Automated 0.4 0.0 - 0.9 %    Lymphocytes % 5.5 13.0 - 44.0 %    Monocytes % 3.6 2.0 - 10.0 %     Eosinophils % 0.0 0.0 - 6.0 %    Basophils % 0.1 0.0 - 2.0 %    Neutrophils Absolute 12.72 (H) 1.20 - 7.70 x10*3/uL    Immature Granulocytes Absolute, Automated 0.06 0.00 - 0.70 x10*3/uL    Lymphocytes Absolute 0.77 (L) 1.20 - 4.80 x10*3/uL    Monocytes Absolute 0.51 0.10 - 1.00 x10*3/uL    Eosinophils Absolute 0.00 0.00 - 0.70 x10*3/uL    Basophils Absolute 0.02 0.00 - 0.10 x10*3/uL   Prepare RBC: 1 Units   Result Value Ref Range    PRODUCT CODE F8417J13     Unit Number [id number]     Unit ABO O     Unit RH POS     XM INTEP COMP     Dispense Status XM     Blood Expiration Date November 20, 2023 23:59 EST     PRODUCT BLOOD TYPE 5100     UNIT VOLUME 318    CBC   Result Value Ref Range    WBC 8.9 4.4 - 11.3 x10*3/uL    nRBC 0.0 0.0 - 0.0 /100 WBCs    RBC 1.70 (L) 4.00 - 5.20 x10*6/uL    Hemoglobin 5.4 (LL) 12.0 - 16.0 g/dL    Hematocrit 18.2 (L) 36.0 - 46.0 %     (H) 80 - 100 fL    MCH 31.8 26.0 - 34.0 pg    MCHC 29.7 (L) 32.0 - 36.0 g/dL    RDW 14.2 11.5 - 14.5 %    Platelets 64 (L) 150 - 450 x10*3/uL    MPV 13.0 (H) 7.5 - 11.5 fL   CBC   Result Value Ref Range    WBC 13.4 (H) 4.4 - 11.3 x10*3/uL    nRBC 0.0 0.0 - 0.0 /100 WBCs    RBC 2.72 (L) 4.00 - 5.20 x10*6/uL    Hemoglobin 8.7 (L) 12.0 - 16.0 g/dL    Hematocrit 27.6 (L) 36.0 - 46.0 %     (H) 80 - 100 fL    MCH 32.0 26.0 - 34.0 pg    MCHC 31.5 (L) 32.0 - 36.0 g/dL    RDW 13.6 11.5 - 14.5 %    Platelets 149 (L) 150 - 450 x10*3/uL    MPV 13.4 (H) 7.5 - 11.5 fL   Coagulation Screen   Result Value Ref Range    Protime 9.4 (L) 9.8 - 12.8 seconds    INR 0.8 (L) 0.9 - 1.1    aPTT 17 (L) 27 - 38 seconds   Fibrinogen   Result Value Ref Range    Fibrinogen 478 (H) 200 - 400 mg/dL   Comprehensive Metabolic Panel   Result Value Ref Range    Glucose 111 (H) 74 - 99 mg/dL    Sodium 135 (L) 136 - 145 mmol/L    Potassium 4.2 3.5 - 5.3 mmol/L    Chloride 104 98 - 107 mmol/L    Bicarbonate 19 (L) 21 - 32 mmol/L    Anion Gap 16 10 - 20 mmol/L    Urea Nitrogen 7 6  - 23 mg/dL    Creatinine 0.57 0.50 - 1.05 mg/dL    eGFR >90 >60 mL/min/1.73m*2    Calcium 7.2 (L) 8.6 - 10.6 mg/dL    Albumin 2.4 (L) 3.4 - 5.0 g/dL    Alkaline Phosphatase 84 33 - 110 U/L    Total Protein 4.6 (L) 6.4 - 8.2 g/dL    AST 31 9 - 39 U/L    Bilirubin, Total 0.3 0.0 - 1.2 mg/dL    ALT 10 7 - 45 U/L   Coagulation Screen   Result Value Ref Range    Protime 9.6 (L) 9.8 - 12.8 seconds    INR 0.9 0.9 - 1.1    aPTT 22 (L) 27 - 38 seconds   Fibrinogen   Result Value Ref Range    Fibrinogen 462 (H) 200 - 400 mg/dL       Relevant Results  MR brain w and wo IV contrast    Result Date: 11/1/2023  Interpreted By:  Gurdeep Hinton and Bera Kaustav STUDY: MR BRAIN W AND WO IV CONTRAST; MR VENOGRAPHY INTRACRANIAL WO IV CONTRAST; MR ANGIO HEAD WO IV CONTRAST;  11/1/2023 12:15 am   INDICATION: Signs/Symptoms:postpartum day 0, new seizure; Signs/Symptoms:postpartum day 0 new seizure.  Versus sequela   COMPARISON: None   ACCESSION NUMBER(S): [id number]; [id number]; [id number]   ORDERING CLINICIAN: HEIKE VARGAS   TECHNIQUE: Axial T2, FLAIR, DWI, gradient echo T2 and  axial T1 weighted images of brain were acquired. Postcontrast T1 weighted images with volumetric reconstructions were obtained after administration of 20 mL of Dotarem, gadolinium based contrast.   Time-of-flight MRA and MRV of the head  and neck was performed. The images were reviewed as source images and maximum intensity projections.   FINDINGS: Brain:   CSF Spaces: The ventricles, sulci and basal cisterns are within normal limits.   Parenchyma: There is no diffusion restriction abnormality to suggest acute infarct. There are a few foci of T2/FLAIR white matter hyperintensity, within the right cerebellar hemisphere (series 2, image 9/41), right occipital lobe (series 3, image 18/41), without enhancement. There is no evidence of abnormal parenchymal or leptomeningeal enhancement. There is no mass effect or midline shift.   Paranasal Sinuses and  Mastoids: There is mild mucosal thickening of scattered ethmoidal air cells. Otherwise, visualized paranasal sinuses and mastoid air cells are unremarkable.   MRA of head:   Anterior circulation:  Triplicate ROMERO, normal anatomic variant. There is otherwise expected flow signal in bilateral intracranial internal carotid arteries, bilateral carotid terminals, bilateral proximal anterior and middle cerebral arteries.   Posterior circulation:  There is fetal origin of the left PCA, origin ating from the left posterior communicating artery. Otherwise, bilateral intracranial vertebral arteries, vertebrobasilar junction, basilar artery and proximal posterior cerebral arteries demonstrate expected flow signal.   MRV of head:   Superior sagittal sinus: Normal. Torcula: Normal. Straight sinus:Normal. Internal cerebral veins/vein of Basim: Normal. Transverse sinuses: Normal. Sigmoid sinuses: Normal. Proximal jugular veins: Normal.       MRI Brain:   1. No evidence of acute infarct, intracranial mass effect or midline shift. 2. Few foci of nonenhancing T2/FLAIR hyperintensity within the right cerebellum and right occipital lobe, nonspecific. However, in a patient of this age sequela of chronic migraine headaches can have this appearance. Appearance is not characteristic for demyelination.   MRA:   No evidence of major vessel cutoff or significant stenosis on MRA head and neck.   MRV:   No evidence of dural venous sinus thrombosis.   I personally reviewed the images/study and I agree with the findings as stated. This study was interpreted at [facility], [place], [place].   MACRO: None   Signed by: Gurdeep Hinton 11/1/2023 1:14 AM Dictation workstation:   [id number]    MR angio head wo IV contrast    Result Date: 11/1/2023  Interpreted By:  Gurdeep Hinton,  and Yoli Castillo STUDY: MR BRAIN W AND WO IV CONTRAST; MR VENOGRAPHY INTRACRANIAL WO IV CONTRAST; MR ANGIO HEAD WO IV CONTRAST;   11/1/2023 12:15 am   INDICATION: Signs/Symptoms:postpartum day 0, new seizure; Signs/Symptoms:postpartum day 0 new seizure.  Versus sequela   COMPARISON: None   ACCESSION NUMBER(S): [id number]; [id number]; [id number]   ORDERING CLINICIAN: HEIKE VARGAS   TECHNIQUE: Axial T2, FLAIR, DWI, gradient echo T2 and  axial T1 weighted images of brain were acquired. Postcontrast T1 weighted images with volumetric reconstructions were obtained after administration of 20 mL of Dotarem, gadolinium based contrast.   Time-of-flight MRA and MRV of the head  and neck was performed. The images were reviewed as source images and maximum intensity projections.   FINDINGS: Brain:   CSF Spaces: The ventricles, sulci and basal cisterns are within normal limits.   Parenchyma: There is no diffusion restriction abnormality to suggest acute infarct. There are a few foci of T2/FLAIR white matter hyperintensity, within the right cerebellar hemisphere (series 2, image 9/41), right occipital lobe (series 3, image 18/41), without enhancement. There is no evidence of abnormal parenchymal or leptomeningeal enhancement. There is no mass effect or midline shift.   Paranasal Sinuses and Mastoids: There is mild mucosal thickening of scattered ethmoidal air cells. Otherwise, visualized paranasal sinuses and mastoid air cells are unremarkable.   MRA of head:   Anterior circulation:  Triplicate ROMERO, normal anatomic variant. There is otherwise expected flow signal in bilateral intracranial internal carotid arteries, bilateral carotid terminals, bilateral proximal anterior and middle cerebral arteries.   Posterior circulation:  There is fetal origin of the left PCA, origin ating from the left posterior communicating artery. Otherwise, bilateral intracranial vertebral arteries, vertebrobasilar junction, basilar artery and proximal posterior cerebral arteries demonstrate expected flow signal.   MRV of head:   Superior sagittal sinus: Normal. Torcula:  Normal. Straight sinus:Normal. Internal cerebral veins/vein of Basim: Normal. Transverse sinuses: Normal. Sigmoid sinuses: Normal. Proximal jugular veins: Normal.       MRI Brain:   1. No evidence of acute infarct, intracranial mass effect or midline shift. 2. Few foci of nonenhancing T2/FLAIR hyperintensity within the right cerebellum and right occipital lobe, nonspecific. However, in a patient of this age sequela of chronic migraine headaches can have this appearance. Appearance is not characteristic for demyelination.   MRA:   No evidence of major vessel cutoff or significant stenosis on MRA head and neck.   MRV:   No evidence of dural venous sinus thrombosis.   I personally reviewed the images/study and I agree with the findings as stated. This study was interpreted at [place], [place].   MACRO: None   Signed by: Gurdeep Hinton 11/1/2023 1:14 AM Dictation workstation:   [id number]    MR venography intracranial wo IV contrast    Result Date: 11/1/2023  Interpreted By:  Gurdeep Hinton,  Kwaku Castillo STUDY: MR BRAIN W AND WO IV CONTRAST; MR VENOGRAPHY INTRACRANIAL WO IV CONTRAST; MR ANGIO HEAD WO IV CONTRAST;  11/1/2023 12:15 am   INDICATION: Signs/Symptoms:postpartum day 0, new seizure; Signs/Symptoms:postpartum day 0 new seizure.  Versus sequela   COMPARISON: None   ACCESSION NUMBER(S): [id number]; [id number]; [id number]   ORDERING CLINICIAN: HEIKE VARGAS   TECHNIQUE: Axial T2, FLAIR, DWI, gradient echo T2 and  axial T1 weighted images of brain were acquired. Postcontrast T1 weighted images with volumetric reconstructions were obtained after administration of 20 mL of Dotarem, gadolinium based contrast.   Time-of-flight MRA and MRV of the head  and neck was performed. The images were reviewed as source images and maximum intensity projections.   FINDINGS: Brain:   CSF Spaces: The ventricles, sulci and basal cisterns are within normal limits.    Parenchyma: There is no diffusion restriction abnormality to suggest acute infarct. There are a few foci of T2/FLAIR white matter hyperintensity, within the right cerebellar hemisphere (series 2, image 9/41), right occipital lobe (series 3, image 18/41), without enhancement. There is no evidence of abnormal parenchymal or leptomeningeal enhancement. There is no mass effect or midline shift.   Paranasal Sinuses and Mastoids: There is mild mucosal thickening of scattered ethmoidal air cells. Otherwise, visualized paranasal sinuses and mastoid air cells are unremarkable.   MRA of head:   Anterior circulation:  Triplicate ROMERO, normal anatomic variant. There is otherwise expected flow signal in bilateral intracranial internal carotid arteries, bilateral carotid terminals, bilateral proximal anterior and middle cerebral arteries.   Posterior circulation:  There is fetal origin of the left PCA, origin ating from the left posterior communicating artery. Otherwise, bilateral intracranial vertebral arteries, vertebrobasilar junction, basilar artery and proximal posterior cerebral arteries demonstrate expected flow signal.   MRV of head:   Superior sagittal sinus: Normal. Torcula: Normal. Straight sinus:Normal. Internal cerebral veins/vein of Basim: Normal. Transverse sinuses: Normal. Sigmoid sinuses: Normal. Proximal jugular veins: Normal.       MRI Brain:   1. No evidence of acute infarct, intracranial mass effect or midline shift. 2. Few foci of nonenhancing T2/FLAIR hyperintensity within the right cerebellum and right occipital lobe, nonspecific. However, in a patient of this age sequela of chronic migraine headaches can have this appearance. Appearance is not characteristic for demyelination.   MRA:   No evidence of major vessel cutoff or significant stenosis on MRA head and neck.   MRV:   No evidence of dural venous sinus thrombosis.   I personally reviewed the images/study and I agree with the findings as stated. This  study was interpreted at [facility], [place], [place].   MACRO: None   Signed by: Gurdeep Hinton 2023 1:14 AM Dictation workstation:   [id number]     Assessment/Plan      Active Problems:  There are no active Hospital Problems.    Genna Garner is a 38 y.o.  female with a PMHx of anxiety and depression  who presents to [facility] as a transfer from [facility] after a seizure during induction of labor.  Neurology consulted for seizure management. She presented for induction of labor to induction of labor to [facility] on 10/30/31. During the induction she had seizure. She has no prior seizure history, no recent head trauma, no recent infections or other etiologies to provoke a seizure. Current differential includes eclampsia/HELLP given border-line high pressures around induction ~130s, thrombocytopenia (plt 140 prior to induction), although LFTs WNL.  Otherwise,  neurological exam was non-focal except for symmetric brisk reflexes.     Pt's mental status is intact on interview next day. Given clinical context and other more likely seizure provoking factors, there is low suspicion for CNS infection. Thus, will not perform LP or recommend antimicrobial therapy. MRI, MRV of the brain is not concerning for CVST, masses or acute insults; however there are non specific white matter changes in R occiput and R cerebellar hemisphere. Risk of recurrent seizures is likely low given this is deemed an acute, symptomatic seizure from an underlying metabolic/hemodynamic derangement. Will continue to monitor EEG and provide further recommendations accordingly.     Recommendations:  - Agree with magnesium infusion for 24h or more after deliver, per primary  - Continue video EEG   - Discontinue Acyclovir, no need for infectious precautions  - Discontinue Keppra    Plan discussed with the attending physician, Dr. Hagen, and communicated with the primary team.    Walker Alonso MD  Neurology  PGY2  Epilepsy team pager [phone]

## 2023-11-01 NOTE — CARE PLAN
The patient's goals for the shift include  remain free from seizures    The clinical goals for the shift include Patient remains free from seizures    Over the shift, the patient did not make progress toward the following goals. Barriers to progression include bed rest due to magnesium sulfate administration.  Recommendations to address these barriers include finish magnesium sulfate administration and initiate appropriate ERAS measures.    Problem: Fall/Injury  Goal: Use assistive devices by end of the shift  Outcome: Not Progressing  Goal: Pace activities to prevent fatigue by end of the shift  Outcome: Not Progressing

## 2023-11-01 NOTE — SIGNIFICANT EVENT
Preliminary EEG is normal.No epileptiform discharges or seizures. Non-specific slowing on left occipital (O1) could be pulse artifact.    This report is until 3:37 am  Final report will be given tomorrow.Please call with questions.       Trevor Mejia MD   Epilepsy Fellow

## 2023-11-01 NOTE — SIGNIFICANT EVENT
Discussed with patient's nurse. Per nurse, patient had GTC with eyes rolled back for a few minutes followed by disorientation for few minutes.  Patient delivered  and then baby was reportedly also having GTC. Discussed with Dr. Polo the recommendation to initiate Keppra, IV acyclivir and to get patient immediately transferred to [facility]. Per primary, BP normal and platelets normal.     I met with patient. She was somewhat sleepy but oriented. Eyes open spontaneously. Following commands. Fluent speech. EOMI. PERRL. 4/5 BUE/BLE. Tactile sensation symmetric in face and limbs. F-N fine without tremor or overshoot.     I discussed this case with Dr. Morales of [facility] Neu[facility] and ensured the OB team, neuro residents, and peds neuro staff were all aware of the case and that transport would arrive within 15 minutes of my assessment of patient.  For this reason, I did not advocate for going to MRI at that time at [facility] as I did not want to delay transfer. I did mention to Dr. Polo the need to MRI Brain W and WO and MRV brain if transfer delayed as well as LP.    Benjamin Medrano MD  Neurology

## 2023-11-01 NOTE — PROGRESS NOTES
Postpartum Progress Note    Assessment/Plan   Genna Garner is a 38 y.o.  at 39w3d now POD 1 (/) s/p stat PLTCS in the setting of seizure of unknown etiology.     Intrapartum seizure  Patient with witnessed 30 second seizure during induction, patient unresponsive with eyes rolled back, rigid limbs  No history of seizure disorder, family history of seizures, HTN disorder of pregnancy prior to this. Denies all symptoms of pre-eclampsia  On chart review, patient with multiple mild range BPs during induction, not 4 hours apart, not meeting criteria for HTN disorder of pregnancy - [place] [facility]  Labs notable for platelets 140 --> 133 >104 >(64 - erroneous lab result) > 149 this AM, otherwise wnl. Repeat PM CBC + CMP pending.   Neurologic exam wnl, cardiopulmonary exam wnl, vitals currently normal  Most likely diagnosis at this time is eclampsia given mild range BP and low platelets. Neurology team following, suspect primary neurologic cause less likely.   Neurology consulted, recs below   S/p 24hrs PP Mg   Continue seizure precautions  Per  neurology no longer recommend acyclovir, keppra, or LP as encephalitis and primary seizure disorder less likely   MRI/MRA/MRV wnl  Video EEG on going     Postpartum care  S/p urgent primary CS in setting of seizure with fetal bradycardia, under GETA  EBL 1140 mL  POD#1 Hgb 8.7, fu AM CBC   Continue routine postpartum care  Breastfeeding/pumping, lactation consultation PRN     Anxiety/depression  Continue home celexa    Appropriate for transfer to postpartum floor now s/p 24hr PP Mg. Will continue to be followed by [facility] service.     Seen and d/w Dr. Herb Stevens MD PGY-4  Obstetrics and Gynecology     Subjective   Feeling ok. Awake and alert, conversing. Pain minimal. Hungry, has been tolerating regular diet. Denies HA, vision changes, CP, SOB, or RUQ pain.     Objective          Last Vitals:  Temp Pulse Resp BP MAP Pulse Ox   36.5 °C (97.7 °F) 90 18  136/66   99 %     Vitals Min/Max Last 24 Hours:  Temp  Min: 36.5 °C (97.7 °F)  Max: 37.2 °C (99 °F)  Pulse  Min: 78  Max: 99  Resp  Min: 16  Max: 18  BP  Min: 107/57  Max: 136/66    Intake/Output:     Intake/Output Summary (Last 24 hours) at 11/1/2023 1856  Last data filed at 11/1/2023 1805  Gross per 24 hour   Intake 2375.78 ml   Output 5350 ml   Net -2974.22 ml       Physical Exam:  Constitutional: Well developed, awake/alert/oriented x3, no distress, alert and cooperative  Eyes: clear sclera  Head/Neck: Normocephalic  Respiratory/Thorax: Normal respiratory effort on RA, CTAB   Cardiovascular: RRR, no murmurs  Gastrointestinal: soft, nondistended, appropriately tender to palpation without rebound or guarding, pfannenstiel incision c/d/I   Musculoskeletal: grossly normal ROM  Extremities: No LE tenderness with palpation  Neurological: awake, alert, oriented   Psychological: Appropriate mood and behavior  Skin: warm, dry, no lesions     Lab Data:  Results from last 7 days   Lab Units 11/01/23  0952 11/01/23  0601 11/01/23  0504 10/31/23  2027   WBC AUTO x10*3/uL  --  13.4* 8.9 14.1*   HEMOGLOBIN g/dL  --  8.7* 5.4* 7.6*   HEMATOCRIT %  --  27.6* 18.2* 24.4*   PLATELETS AUTO x10*3/uL  --  149* 64* 104*   AST U/L 31  --   --  28   ALT U/L 10  --   --  11

## 2023-11-01 NOTE — LACTATION NOTE
This note was copied from a baby's chart.      Recommendations/Summary       I spoke with Baby's father at his bedside to explained availability of Lactation Consult services. Provided patient instruction materials.  FOB reports taht mom is in the ICU and he does not think she will be able to start pumping today.  He reports that mom has a pump for home use.  I discussed with FOB that when mom is able to it would benefit her milk supply to get started pumping.  I discussed with him the avavilablity of Lacation support through [employer] as well. I invited him to reach out for support if he or mom has questions.

## 2023-11-02 ENCOUNTER — APPOINTMENT (OUTPATIENT)
Dept: OBSTETRICS AND GYNECOLOGY | Facility: CLINIC | Age: 38
End: 2023-11-02
Payer: COMMERCIAL

## 2023-11-02 ENCOUNTER — APPOINTMENT (OUTPATIENT)
Dept: CARDIOLOGY | Facility: HOSPITAL | Age: 38
End: 2023-11-02
Payer: COMMERCIAL

## 2023-11-02 LAB
ATRIAL RATE: 99 BPM
P AXIS: 46 DEGREES
P OFFSET: 209 MS
P ONSET: 160 MS
PR INTERVAL: 120 MS
Q ONSET: 220 MS
QRS COUNT: 16 BEATS
QRS DURATION: 84 MS
QT INTERVAL: 356 MS
QTC CALCULATION(BAZETT): 456 MS
QTC FREDERICIA: 420 MS
R AXIS: 28 DEGREES
T AXIS: 37 DEGREES
T OFFSET: 398 MS
VENTRICULAR RATE: 99 BPM

## 2023-11-02 PROCEDURE — 2500000004 HC RX 250 GENERAL PHARMACY W/ HCPCS (ALT 636 FOR OP/ED): Performed by: STUDENT IN AN ORGANIZED HEALTH CARE EDUCATION/TRAINING PROGRAM

## 2023-11-02 PROCEDURE — 1210000001 HC SEMI-PRIVATE ROOM DAILY

## 2023-11-02 PROCEDURE — 93005 ELECTROCARDIOGRAM TRACING: CPT

## 2023-11-02 PROCEDURE — 2500000001 HC RX 250 WO HCPCS SELF ADMINISTERED DRUGS (ALT 637 FOR MEDICARE OP): Performed by: STUDENT IN AN ORGANIZED HEALTH CARE EDUCATION/TRAINING PROGRAM

## 2023-11-02 PROCEDURE — 96372 THER/PROPH/DIAG INJ SC/IM: CPT | Performed by: STUDENT IN AN ORGANIZED HEALTH CARE EDUCATION/TRAINING PROGRAM

## 2023-11-02 RX ORDER — DOCUSATE SODIUM 100 MG/1
100 CAPSULE, LIQUID FILLED ORAL 2 TIMES DAILY
Status: DISCONTINUED | OUTPATIENT
Start: 2023-11-02 | End: 2023-11-03 | Stop reason: HOSPADM

## 2023-11-02 RX ADMIN — ENOXAPARIN SODIUM 40 MG: 100 INJECTION SUBCUTANEOUS at 08:30

## 2023-11-02 RX ADMIN — ACETAMINOPHEN 975 MG: 325 TABLET ORAL at 04:06

## 2023-11-02 RX ADMIN — IBUPROFEN 600 MG: 600 TABLET ORAL at 04:07

## 2023-11-02 RX ADMIN — ACETAMINOPHEN 975 MG: 325 TABLET ORAL at 16:11

## 2023-11-02 RX ADMIN — CITALOPRAM HYDROBROMIDE 80 MG: 40 TABLET ORAL at 08:30

## 2023-11-02 RX ADMIN — ACETAMINOPHEN 975 MG: 325 TABLET ORAL at 23:24

## 2023-11-02 RX ADMIN — IBUPROFEN 600 MG: 600 TABLET ORAL at 16:11

## 2023-11-02 RX ADMIN — ACETAMINOPHEN 975 MG: 325 TABLET ORAL at 10:01

## 2023-11-02 RX ADMIN — IBUPROFEN 600 MG: 600 TABLET ORAL at 10:02

## 2023-11-02 RX ADMIN — IBUPROFEN 600 MG: 600 TABLET ORAL at 23:25

## 2023-11-02 RX ADMIN — DOCUSATE SODIUM 100 MG: 100 CAPSULE, LIQUID FILLED ORAL at 23:24

## 2023-11-02 SDOH — SOCIAL STABILITY: SOCIAL INSECURITY: DOES ANYONE TRY TO KEEP YOU FROM HAVING/CONTACTING OTHER FRIENDS OR DOING THINGS OUTSIDE YOUR HOME?: NO

## 2023-11-02 SDOH — SOCIAL STABILITY: SOCIAL INSECURITY: HAS ANYONE EVER THREATENED TO HURT YOUR FAMILY OR YOUR PETS?: NO

## 2023-11-02 SDOH — HEALTH STABILITY: MENTAL HEALTH: NON-SPECIFIC ACTIVE SUICIDAL THOUGHTS (PAST 1 MONTH): NO

## 2023-11-02 SDOH — SOCIAL STABILITY: SOCIAL INSECURITY: ABUSE SCREEN: ADULT

## 2023-11-02 SDOH — SOCIAL STABILITY: SOCIAL INSECURITY: HAVE YOU HAD THOUGHTS OF HARMING ANYONE ELSE?: NO

## 2023-11-02 SDOH — ECONOMIC STABILITY: HOUSING INSECURITY: DO YOU FEEL UNSAFE GOING BACK TO THE PLACE WHERE YOU ARE LIVING?: NO

## 2023-11-02 SDOH — HEALTH STABILITY: MENTAL HEALTH: WERE YOU ABLE TO COMPLETE ALL THE BEHAVIORAL HEALTH SCREENINGS?: YES

## 2023-11-02 SDOH — SOCIAL STABILITY: SOCIAL INSECURITY: PHYSICAL ABUSE: DENIES

## 2023-11-02 SDOH — SOCIAL STABILITY: SOCIAL INSECURITY: ARE YOU OR HAVE YOU BEEN THREATENED OR ABUSED PHYSICALLY, EMOTIONALLY, OR SEXUALLY BY ANYONE?: NO

## 2023-11-02 SDOH — SOCIAL STABILITY: SOCIAL INSECURITY: DO YOU FEEL ANYONE HAS EXPLOITED OR TAKEN ADVANTAGE OF YOU FINANCIALLY OR OF YOUR PERSONAL PROPERTY?: NO

## 2023-11-02 SDOH — SOCIAL STABILITY: SOCIAL INSECURITY: ARE THERE ANY APPARENT SIGNS OF INJURIES/BEHAVIORS THAT COULD BE RELATED TO ABUSE/NEGLECT?: NO

## 2023-11-02 SDOH — SOCIAL STABILITY: SOCIAL INSECURITY: VERBAL ABUSE: DENIES

## 2023-11-02 SDOH — HEALTH STABILITY: MENTAL HEALTH: WISH TO BE DEAD (PAST 1 MONTH): NO

## 2023-11-02 SDOH — HEALTH STABILITY: MENTAL HEALTH: SUICIDAL BEHAVIOR (LIFETIME): NO

## 2023-11-02 ASSESSMENT — LIFESTYLE VARIABLES
SKIP TO QUESTIONS 9-10: 1
HOW OFTEN DO YOU HAVE A DRINK CONTAINING ALCOHOL: NEVER
AUDIT-C TOTAL SCORE: 0
AUDIT-C TOTAL SCORE: 0
HOW MANY STANDARD DRINKS CONTAINING ALCOHOL DO YOU HAVE ON A TYPICAL DAY: PATIENT DOES NOT DRINK
HOW OFTEN DO YOU HAVE 6 OR MORE DRINKS ON ONE OCCASION: NEVER

## 2023-11-02 ASSESSMENT — PAIN SCALES - GENERAL
PAINLEVEL_OUTOF10: 2
PAINLEVEL_OUTOF10: 0 - NO PAIN
PAINLEVEL_OUTOF10: 1
PAINLEVEL_OUTOF10: 3
PAINLEVEL_OUTOF10: 2
PAINLEVEL_OUTOF10: 4

## 2023-11-02 ASSESSMENT — PATIENT HEALTH QUESTIONNAIRE - PHQ9
SUM OF ALL RESPONSES TO PHQ9 QUESTIONS 1 & 2: 0
1. LITTLE INTEREST OR PLEASURE IN DOING THINGS: NOT AT ALL
2. FEELING DOWN, DEPRESSED OR HOPELESS: NOT AT ALL

## 2023-11-02 ASSESSMENT — ACTIVITIES OF DAILY LIVING (ADL): LACK_OF_TRANSPORTATION: NO

## 2023-11-02 NOTE — LACTATION NOTE
Lactation Consultant Note  Lactation Consultation  Reason for Consult: Initial assessment  Consultant Name: Ana Terry RN IBCLC    Maternal Information       Maternal Assessment       Infant Assessment       Feeding Assessment       LATCH TOOL       Breast Pump       Other OB Lactation Tools       Patient Follow-up       Other OB Lactation Documentation       Recommendations/Summary  Attempted to see mother-mother sleeping soundly and was left undisturbed. RN was notified.

## 2023-11-02 NOTE — CARE PLAN
"The patient's goals for the shift include \"to see my baby\"    The clinical goals for the shift include decreased pain      Problem: Skin  Goal: Decreased wound size/increased tissue granulation at next dressing change  Outcome: Progressing  Goal: Participates in plan/prevention/treatment measures  Outcome: Progressing  Goal: Prevent/manage excess moisture  Outcome: Progressing  Goal: Prevent/minimize sheer/friction injuries  Outcome: Progressing  Goal: Promote/optimize nutrition  Outcome: Progressing  Goal: Promote skin healing  Outcome: Progressing     Problem: Fall/Injury  Goal: Not fall by end of shift  Outcome: Progressing  Goal: Be free from injury by end of the shift  Outcome: Progressing  Goal: Verbalize understanding of personal risk factors for fall in the hospital  Outcome: Progressing  Goal: Verbalize understanding of risk factor reduction measures to prevent injury from fall in the home  Outcome: Progressing  Goal: Use assistive devices by end of the shift  Outcome: Progressing  Goal: Pace activities to prevent fatigue by end of the shift  Outcome: Progressing     Problem: Vaginal Birth or  Section  Goal: Fetal and maternal status remain reassuring during the birth process  Outcome: Progressing  Goal: Tolerate CRB for IOL placement maintenance until dislodgement/removal 12hrs after placement  Outcome: Progressing  Goal: Prevention of malpresentation/labor dystocia through delivery  Outcome: Progressing  Goal: Demonstrates labor coping techniques through delivery  Outcome: Progressing  Goal: Minimal s/sx of HDP and BP<160/110  Outcome: Progressing  Goal: No s/sx of infection through recovery  Outcome: Progressing  Goal: No s/sx of hemorrhage through recovery  Outcome: Progressing     Problem: Postpartum  Goal: Experiences normal postpartum course  Outcome: Progressing  Goal: Appropriate maternal -  bonding  Outcome: Progressing  Goal: Establish and maintain infant feeding pattern for " adequate nutrition  Outcome: Progressing  Goal: Incisions, wounds, or drain sites healing without S/S of infection  Outcome: Progressing  Flowsheets (Taken 2023 1694)  Incisions, wounds, or drain sites healing without sign and symptoms of infection: TWICE DAILY: Assess and document dressing/incision, wound bed, drain sites and surrounding tissue  Goal: No s/sx infection  Outcome: Progressing  Goal: No s/sx of hemorrhage  Outcome: Progressing  Goal: Minimal s/sx of HDP and BP<160/110  Outcome: Progressing     Problem:  Recovery Care  Goal: Verbalizes understanding of post-op instructions  Outcome: Progressing  Goal: Manages discomfort  Outcome: Progressing  Goal: Dressing intact until removed with any drainage marked  Outcome: Progressing  Goal: Patient vital signs are stable  Outcome: Progressing  Goal: Urine output is 0.5 mL/kg/hr or more  Outcome: Progressing  Goal: Fundus firm at midline  Outcome: Progressing     Problem: Pain - Adult  Goal: Verbalizes/displays adequate comfort level or baseline comfort level  Outcome: Progressing     Problem: Safety - Adult  Goal: Free from fall injury  Outcome: Progressing     Problem: Discharge Planning  Goal: Discharge to home or other facility with appropriate resources  Outcome: Progressing

## 2023-11-02 NOTE — CARE PLAN
VSS t/o shift. No seizures overnight. Pt ambulating with assistance, voiding, and passing flatus. Fundus, bleeding, and incision WNL.      Problem: Skin  Goal: Decreased wound size/increased tissue granulation at next dressing change  Outcome: Progressing  Goal: Participates in plan/prevention/treatment measures  Outcome: Progressing  Goal: Prevent/manage excess moisture  Outcome: Progressing  Goal: Prevent/minimize sheer/friction injuries  Outcome: Progressing  Goal: Promote/optimize nutrition  Outcome: Progressing  Goal: Promote skin healing  Outcome: Progressing     Problem: Fall/Injury  Goal: Not fall by end of shift  Outcome: Progressing  Goal: Be free from injury by end of the shift  Outcome: Progressing  Goal: Verbalize understanding of personal risk factors for fall in the hospital  Outcome: Progressing  Goal: Verbalize understanding of risk factor reduction measures to prevent injury from fall in the home  Outcome: Progressing  Goal: Use assistive devices by end of the shift  Outcome: Progressing  Goal: Pace activities to prevent fatigue by end of the shift  Outcome: Progressing

## 2023-11-02 NOTE — HOSPITAL COURSE
Hospital Course  Pt was transferred on POD#0 s/p urgent pLTCS in the s/o maternal seizure and fetal bradycardia. Pt was started on Mg for seizure ppx and keppra and acyclovir. On chart review she was found to have mild range Bps (thought < 4 hours apart) and labs were notable plts that nadired at 104, most c/w eclampsia following workup. Neurology consulted and workup negative including 24 hour EEG. Acyclovir and Keppra d/c'ed due to low suscipion for encephalitis or seizure disorder. No indication for BP. She received 24 hours of Mg. Blood pressures overall normotensive, no PO agent. Breastfeeding. Declines PPBC.     Admission H&P  38 y.o.  at 39w3d admitted for seizure of unknown etiology. Patient presents as a transfer from [facility] after urgent pLTCS on 10/31 in the setting of intrapartum maternal seizure. At approximately 1230pm, patient was found to be unresponsive, eyes closed and rolled back, mouth open, and limbs rigid for about 30 seconds. Had postictal state in which she was disoriented and confused. Fetal bradycardia was noted for 6 minutes and decision was made to proceed with urgent CS, which was uncomplicated, EBL 1140mL.      Patient states that she remembers seeing flashing white lights and then does not remember anything until waking up after her CS. States she does not remember having a headache. Currently denies headache, vision changes, RUQ pain, chest pain, SOB, numbness, tingling, weakness. Having incisional pain and feeling very tired, otherwise asymptomatic. Had an uncomplicated pregnancy prior to this, denies any personal or family history of HTN, seizure disorder, neurologic problems. Denies history of STIs, cold sores, HSV. Denies substance use. Prior delivery was 38wk  only complicated by FGR.

## 2023-11-02 NOTE — PROGRESS NOTES
Postpartum Progress Note    Assessment/Plan   Genna Garner is a 38 y.o.  at 39w3d now POD 2 (11/) s/p stat PLTCS in the setting of seizure of unknown etiology.     Intrapartum seizure  Patient with witnessed 30 second seizure during induction, patient unresponsive with eyes rolled back, rigid limbs  No history of seizure disorder, family history of seizures, HTN disorder of pregnancy prior to this. Denies all symptoms of pre-eclampsia  On chart review, patient with multiple mild range BPs during induction, not 4 hours apart - normotensive while at [facility]  Labs notable for platelets 140 --> 133 >104 >(64 - erroneous lab result) > 149 > 155 otherwise wnl.  Neurologic exam wnl, cardiopulmonary exam wnl, vitals currently normal  Diagnosis most c/w eclampsia given mild range BP and low platelets. Neurology team following, suspect primary neurologic cause less likely. Per neurology no longer recommend acyclovir, keppra, or LP as encephalitis and primary seizure disorder less likely   S/p 24hrs PP Mg   MRI/MRA/MRV wnl  S/p 24 hour video EEG     Postpartum care  S/p urgent primary CS in setting of seizure with fetal bradycardia, under GETA  EBL 1140 mL  POD#2 Hgb 8.2   Continue routine postpartum care, declines PPBC  Breastfeeding/pumping, lactation consultation PRN     Anxiety/depression  Continue home celexa    Dispo: anticipate discharge POD#3 if continues to meet postop milestones and Bps remained well controlled.    Seen and d/w Dr. Kelly Feliz MD  [facility] [id number]    Subjective   Feeling ok. Has been tolerating regular diet. Ambulating, passing gas. Denies HA, vision changes, CP, SOB, or RUQ pain.     Objective          Last Vitals:  Temp Pulse Resp BP MAP Pulse Ox   36.3 °C (97.3 °F) 74 18 104/65   99 %     Vitals Min/Max Last 24 Hours:  Temp  Min: 36.3 °C (97.3 °F)  Max: 37 °C (98.6 °F)  Pulse  Min: 74  Max: 99  Resp  Min: 16  Max: 20  BP  Min: 100/62  Max: 136/66    Intake/Output:      Intake/Output Summary (Last 24 hours) at 11/2/2023 0755  Last data filed at 11/2/2023 0430  Gross per 24 hour   Intake 801.8 ml   Output 2900 ml   Net -2098.2 ml       Physical Exam:  Constitutional: Well developed, awake/alert/oriented x3, no distress, alert and cooperative  Eyes: clear sclera  Head/Neck: Normocephalic  Respiratory/Thorax: Normal respiratory effort on RA  Cardiovascular: HR wnl   Gastrointestinal: soft, nondistended, appropriately tender to palpation without rebound or guarding, pfannenstiel incision c/d/I   Musculoskeletal: grossly normal ROM  Extremities: No LE tenderness with palpation  Neurological: awake, alert, oriented   Psychological: Appropriate mood and behavior  Skin: warm, dry, no lesions     Lab Data:  Results from last 7 days   Lab Units 11/01/23  1912 11/01/23  0952 11/01/23  0601   WBC AUTO x10*3/uL 12.1*  --  13.4*   HEMOGLOBIN g/dL 8.2*  --  8.7*   HEMATOCRIT % 25.3*  --  27.6*   PLATELETS AUTO x10*3/uL 155  --  149*   AST U/L 29 31  --    ALT U/L 13 10  --

## 2023-11-03 ENCOUNTER — PHARMACY VISIT (OUTPATIENT)
Dept: PHARMACY | Facility: CLINIC | Age: 38
End: 2023-11-03
Payer: COMMERCIAL

## 2023-11-03 ENCOUNTER — DOCUMENTATION (OUTPATIENT)
Dept: OBSTETRICS AND GYNECOLOGY | Facility: HOSPITAL | Age: 38
End: 2023-11-03
Payer: COMMERCIAL

## 2023-11-03 VITALS
DIASTOLIC BLOOD PRESSURE: 79 MMHG | SYSTOLIC BLOOD PRESSURE: 155 MMHG | RESPIRATION RATE: 22 BRPM | OXYGEN SATURATION: 100 % | HEART RATE: 81 BPM | TEMPERATURE: 96.8 F

## 2023-11-03 LAB
BLOOD EXPIRATION DATE: NORMAL
DISPENSE STATUS: NORMAL
PRODUCT BLOOD TYPE: 5100
PRODUCT CODE: NORMAL
UNIT ABO: NORMAL
UNIT NUMBER: NORMAL
UNIT RH: NORMAL
UNIT VOLUME: 318
XM INTEP: NORMAL

## 2023-11-03 PROCEDURE — RXMED WILLOW AMBULATORY MEDICATION CHARGE

## 2023-11-03 PROCEDURE — 96372 THER/PROPH/DIAG INJ SC/IM: CPT | Performed by: STUDENT IN AN ORGANIZED HEALTH CARE EDUCATION/TRAINING PROGRAM

## 2023-11-03 PROCEDURE — 99238 HOSP IP/OBS DSCHRG MGMT 30/<: CPT | Performed by: STUDENT IN AN ORGANIZED HEALTH CARE EDUCATION/TRAINING PROGRAM

## 2023-11-03 PROCEDURE — 95715 VEEG EA 12-26HR INTMT MNTR: CPT

## 2023-11-03 PROCEDURE — 2500000004 HC RX 250 GENERAL PHARMACY W/ HCPCS (ALT 636 FOR OP/ED): Performed by: STUDENT IN AN ORGANIZED HEALTH CARE EDUCATION/TRAINING PROGRAM

## 2023-11-03 PROCEDURE — 2500000001 HC RX 250 WO HCPCS SELF ADMINISTERED DRUGS (ALT 637 FOR MEDICARE OP): Performed by: STUDENT IN AN ORGANIZED HEALTH CARE EDUCATION/TRAINING PROGRAM

## 2023-11-03 PROCEDURE — 95700 EEG CONT REC W/VID EEG TECH: CPT

## 2023-11-03 RX ORDER — IBUPROFEN 600 MG/1
600 TABLET ORAL EVERY 6 HOURS PRN
Qty: 60 TABLET | Refills: 0 | Status: SHIPPED | OUTPATIENT
Start: 2023-11-03 | End: 2023-12-11 | Stop reason: ALTCHOICE

## 2023-11-03 RX ORDER — IBUPROFEN 600 MG/1
600 TABLET ORAL EVERY 6 HOURS PRN
Qty: 60 TABLET | Refills: 0 | Status: SHIPPED | OUTPATIENT
Start: 2023-11-03 | End: 2023-11-03 | Stop reason: SDUPTHER

## 2023-11-03 RX ORDER — NALOXONE HYDROCHLORIDE 4 MG/.1ML
4 SPRAY NASAL AS NEEDED
Qty: 2 EACH | Refills: 11 | Status: SHIPPED | OUTPATIENT
Start: 2023-11-03 | End: 2023-11-03 | Stop reason: SDUPTHER

## 2023-11-03 RX ORDER — NALOXONE HYDROCHLORIDE 4 MG/.1ML
4 SPRAY NASAL AS NEEDED
Qty: 2 EACH | Refills: 11 | Status: SHIPPED | OUTPATIENT
Start: 2023-11-03 | End: 2023-11-14 | Stop reason: HOSPADM

## 2023-11-03 RX ORDER — POLYETHYLENE GLYCOL 3350 17 G/17G
17 POWDER, FOR SOLUTION ORAL DAILY
Qty: 30 PACKET | Refills: 0 | Status: SHIPPED | OUTPATIENT
Start: 2023-11-03 | End: 2023-11-03 | Stop reason: SDUPTHER

## 2023-11-03 RX ORDER — ACETAMINOPHEN 500 MG
1000 TABLET ORAL EVERY 6 HOURS PRN
Qty: 60 TABLET | Refills: 0 | Status: SHIPPED | OUTPATIENT
Start: 2023-11-03 | End: 2023-11-03 | Stop reason: SDUPTHER

## 2023-11-03 RX ORDER — OXYCODONE HYDROCHLORIDE 5 MG/1
5 TABLET ORAL EVERY 6 HOURS PRN
Qty: 10 TABLET | Refills: 0 | Status: SHIPPED | OUTPATIENT
Start: 2023-11-03 | End: 2023-11-03 | Stop reason: SDUPTHER

## 2023-11-03 RX ORDER — POLYETHYLENE GLYCOL 3350 17 G/17G
17 POWDER, FOR SOLUTION ORAL DAILY
Qty: 476 G | Refills: 0 | Status: SHIPPED | OUTPATIENT
Start: 2023-11-03 | End: 2023-12-11 | Stop reason: ALTCHOICE

## 2023-11-03 RX ORDER — ACETAMINOPHEN 500 MG
1000 TABLET ORAL EVERY 6 HOURS PRN
Qty: 60 TABLET | Refills: 0 | Status: SHIPPED | OUTPATIENT
Start: 2023-11-03 | End: 2023-12-11 | Stop reason: ALTCHOICE

## 2023-11-03 RX ORDER — OXYCODONE HYDROCHLORIDE 5 MG/1
5 TABLET ORAL EVERY 6 HOURS PRN
Qty: 10 TABLET | Refills: 0 | Status: ON HOLD | OUTPATIENT
Start: 2023-11-03 | End: 2023-11-14 | Stop reason: ENTERED-IN-ERROR

## 2023-11-03 RX ADMIN — DOCUSATE SODIUM 100 MG: 100 CAPSULE, LIQUID FILLED ORAL at 08:34

## 2023-11-03 RX ADMIN — IBUPROFEN 600 MG: 600 TABLET ORAL at 04:50

## 2023-11-03 RX ADMIN — ACETAMINOPHEN 975 MG: 325 TABLET ORAL at 04:50

## 2023-11-03 RX ADMIN — CITALOPRAM HYDROBROMIDE 80 MG: 40 TABLET ORAL at 08:34

## 2023-11-03 RX ADMIN — IBUPROFEN 600 MG: 600 TABLET ORAL at 13:52

## 2023-11-03 RX ADMIN — ACETAMINOPHEN 975 MG: 325 TABLET ORAL at 13:53

## 2023-11-03 RX ADMIN — ENOXAPARIN SODIUM 40 MG: 100 INJECTION SUBCUTANEOUS at 08:35

## 2023-11-03 ASSESSMENT — PAIN SCALES - GENERAL
PAINLEVEL_OUTOF10: 0 - NO PAIN
PAINLEVEL_OUTOF10: 3
PAINLEVEL_OUTOF10: 2

## 2023-11-03 NOTE — CARE PLAN
VSS t/o shift. No seizure activity overnight. Fundus, incision, and bleeding WNL. Pt ambulating, voiding, passing flatus/BM's, pumping, and visits NICU.       Problem: Fall/Injury  Goal: Not fall by end of shift  Outcome: Progressing  Goal: Be free from injury by end of the shift  Outcome: Progressing  Goal: Verbalize understanding of personal risk factors for fall in the hospital  Outcome: Progressing  Goal: Verbalize understanding of risk factor reduction measures to prevent injury from fall in the home  Outcome: Progressing  Goal: Use assistive devices by end of the shift  Outcome: Progressing  Goal: Pace activities to prevent fatigue by end of the shift  Outcome: Progressing     Problem: Postpartum  Goal: Experiences normal postpartum course  Outcome: Progressing  Goal: Appropriate maternal -  bonding  Outcome: Progressing  Goal: Establish and maintain infant feeding pattern for adequate nutrition  Outcome: Progressing  Goal: Incisions, wounds, or drain sites healing without S/S of infection  Outcome: Progressing  Goal: No s/sx infection  Outcome: Progressing  Goal: No s/sx of hemorrhage  Outcome: Progressing  Goal: Minimal s/sx of HDP and BP<160/110  Outcome: Progressing     Problem:  Recovery Care  Goal: Verbalizes understanding of post-op instructions  Outcome: Progressing  Goal: Manages discomfort  Outcome: Progressing  Goal: Patient vital signs are stable  Outcome: Progressing  Goal: Urine output is 0.5 mL/kg/hr or more  Outcome: Progressing  Goal: Fundus firm at midline  Outcome: Progressing     Problem: Pain - Adult  Goal: Verbalizes/displays adequate comfort level or baseline comfort level  Outcome: Progressing     Problem: Safety - Adult  Goal: Free from fall injury  Outcome: Progressing

## 2023-11-03 NOTE — LACTATION NOTE
This note was copied from a baby's chart.  Lactation Consultant Note    Recommendations/Summary       I spoke with mo at pt's bedside to see how pumping was progressing.  Mom reports that with her last pumping, she  has gotten some colostrum form her right breast but with this pumping her left breast didn't produce.  I discussed with mom that this can occur at times in the early days of milk production.  Mom reports that she is using a 27 mm breast shield.  Mom was given a set of 30 mm shields as well as a pair of medium pump n pals to trial as she may find them more comfortable.  Mom has breast fed before and has no questions about pumping at this time.  The baby continues on his cooling protocol and so mom will not be able to put baby to breast until he is fully rewarmed tomorrow.  Mom encouraged to continue working on her milk supply.  She was invited to follow up with Lactation as needed.

## 2023-11-03 NOTE — CARE PLAN
Problem: Skin  Goal: Decreased wound size/increased tissue granulation at next dressing change  Outcome: Met  Goal: Participates in plan/prevention/treatment measures  Outcome: Met  Goal: Prevent/manage excess moisture  Outcome: Met  Goal: Prevent/minimize sheer/friction injuries  Outcome: Met  Goal: Promote/optimize nutrition  Outcome: Met  Goal: Promote skin healing  Outcome: Met   The patient's goals for the shift include pump milk for my baby.    The clinical goals for the shift include pain will be a 4 or less through today.    Over the shift, the patient did not make progress toward the following goals. Barriers to progression include ***. Recommendations to address these barriers include ***.

## 2023-11-03 NOTE — DISCHARGE INSTR - ACTIVITY
Dont drive a car for 2 weeks after your delivery,don't lift anything heavier than 10 pounds and limit your stair climbing if possible. Wait to have sex until your bleeding has completely stopped and your  Says it is ok.Use pads, not tampons right now.You can take a shower but not a bath.

## 2023-11-05 LAB — BACTERIA BLD CULT: NORMAL

## 2023-11-06 NOTE — SIGNIFICANT EVENT
Follow-up Phone Call Note:   Interview:  Care Type: Women's Health   Phone Number Called: [phone]   Call Outcome: left a message   Date/Time Of Call: 11/6/23 @ 1217   Call Back Done By: care coordinator   Callback Complete:  Yes            
Patient meets criteria for home monitoring of blood pressure post discharge.  Met with patient to assess for availability of home BP monitor.  Patient does not have access to BP monitor at home.  Pt agreed to order home BP monitor from [employer]. Large BP monitor delivered to room.  Patient educated on how to use BP monitor, recording BP's on home monitoring log and s/sx to report to her provider.  Pt verbalized understanding the above information.   
Updated epilepsy team assessment and recommendations:    Genna Garner is a 38 y.o.  female with a PMHx of anxiety and depression  who presents to [facility] as a transfer from [facility] after a seizure during induction of labor.  Neurology consulted for seizure management. She presented for induction of labor to induction of labor to [facility] on 10/30/31. During the induction she had seizure. She has no prior seizure history, no recent head trauma, no recent infections or other etiologies to provoke a seizure. Current differential includes eclampsia/HELLP given border-line high pressures around induction ~130s, thrombocytopenia (plt 140 prior to induction), although LFTs WNL.  Otherwise,  neurological exam was non-focal except for symmetric brisk reflexes.      Pt's mental status is intact on interview next day. Magnesium bolus and infusion given by primary team post delivery for 24h. Keppra stopped in the morning.    Given clinical context and other more likely seizure provoking factors, there is low suspicion for CNS infection. Thus, will not perform LP or recommend antimicrobial therapy. MRI, MRV of the brain is not concerning for CVST, masses or acute insults; however there are non specific white matter changes in R occiput and R cerebellar hemisphere. Risk of recurrent seizures is likely low given this is deemed an acute, symptomatic seizure from an underlying metabolic/hemodynamic derangement. Video EEG for 2 days did not show underlying epileptogenicity or seizure activity.      Recommendations:  - Please arrange outpatient follow up with neurology-epilepsy, Dr. Hagen, in 1 month  - No further recommendations or precautions from seizure standpoint    Thank you for your consult. Epilepsy team signs off. Please page for any concerns.     Plan discussed with the attending physician, Dr. Hagen, and communicated with the primary team.     Walker Alonso MD  Neurology PGY2  Epilepsy team pager [phone]  
no

## 2023-11-08 ENCOUNTER — LACTATION ENCOUNTER (OUTPATIENT)
Age: 38
End: 2023-11-08

## 2023-11-08 NOTE — LACTATION NOTE
This note was copied from a baby's chart.  Mom states that her skin breakdown around her nipples is almost completely healed. Mom is not in any pain. Mom encouraged to contact lactation with any questions or concerns.

## 2023-11-09 LAB
LABORATORY COMMENT REPORT: NORMAL
PATH REPORT.FINAL DX SPEC: NORMAL
PATH REPORT.GROSS SPEC: NORMAL
PATH REPORT.RELEVANT HX SPEC: NORMAL
PATH REPORT.TOTAL CANCER: NORMAL

## 2023-11-13 ENCOUNTER — TELEPHONE (OUTPATIENT)
Dept: OBSTETRICS AND GYNECOLOGY | Facility: CLINIC | Age: 38
End: 2023-11-13

## 2023-11-13 ENCOUNTER — HOSPITAL ENCOUNTER (OUTPATIENT)
Facility: HOSPITAL | Age: 38
Setting detail: OBSERVATION
Discharge: HOME | End: 2023-11-14
Attending: EMERGENCY MEDICINE | Admitting: INTERNAL MEDICINE
Payer: COMMERCIAL

## 2023-11-13 ENCOUNTER — APPOINTMENT (OUTPATIENT)
Dept: RADIOLOGY | Facility: HOSPITAL | Age: 38
End: 2023-11-13
Payer: COMMERCIAL

## 2023-11-13 DIAGNOSIS — R60.9 EDEMA, UNSPECIFIED TYPE: ICD-10-CM

## 2023-11-13 DIAGNOSIS — R20.0 NUMBNESS AND TINGLING: Primary | ICD-10-CM

## 2023-11-13 DIAGNOSIS — R55 SYNCOPE, UNSPECIFIED SYNCOPE TYPE: ICD-10-CM

## 2023-11-13 DIAGNOSIS — R20.2 NUMBNESS AND TINGLING: Primary | ICD-10-CM

## 2023-11-13 DIAGNOSIS — N10 ACUTE PYELONEPHRITIS: ICD-10-CM

## 2023-11-13 PROBLEM — D72.829 LEUKOCYTOSIS: Status: ACTIVE | Noted: 2023-11-13

## 2023-11-13 LAB
ALBUMIN SERPL BCP-MCNC: 3.5 G/DL (ref 3.4–5)
ALP SERPL-CCNC: 54 U/L (ref 33–110)
ALT SERPL W P-5'-P-CCNC: 12 U/L (ref 7–45)
ANION GAP SERPL CALC-SCNC: 16 MMOL/L (ref 10–20)
AST SERPL W P-5'-P-CCNC: 13 U/L (ref 9–39)
BASOPHILS # BLD AUTO: 0.04 X10*3/UL (ref 0–0.1)
BASOPHILS NFR BLD AUTO: 0.2 %
BILIRUB SERPL-MCNC: 0.4 MG/DL (ref 0–1.2)
BNP SERPL-MCNC: 33 PG/ML (ref 0–99)
BUN SERPL-MCNC: 15 MG/DL (ref 6–23)
CALCIUM SERPL-MCNC: 8.7 MG/DL (ref 8.6–10.3)
CARDIAC TROPONIN I PNL SERPL HS: <3 NG/L (ref 0–13)
CARDIAC TROPONIN I PNL SERPL HS: <3 NG/L (ref 0–13)
CHLORIDE SERPL-SCNC: 104 MMOL/L (ref 98–107)
CK SERPL-CCNC: 34 U/L (ref 0–215)
CO2 SERPL-SCNC: 20 MMOL/L (ref 21–32)
CREAT SERPL-MCNC: 0.7 MG/DL (ref 0.5–1.05)
CRP SERPL-MCNC: 9.23 MG/DL
EOSINOPHIL # BLD AUTO: 0.36 X10*3/UL (ref 0–0.7)
EOSINOPHIL NFR BLD AUTO: 2 %
ERYTHROCYTE [DISTWIDTH] IN BLOOD BY AUTOMATED COUNT: 13.1 % (ref 11.5–14.5)
ERYTHROCYTE [SEDIMENTATION RATE] IN BLOOD BY WESTERGREN METHOD: 83 MM/H (ref 0–20)
FLUAV RNA RESP QL NAA+PROBE: NOT DETECTED
FLUBV RNA RESP QL NAA+PROBE: NOT DETECTED
GFR SERPL CREATININE-BSD FRML MDRD: >90 ML/MIN/1.73M*2
GLUCOSE BLD MANUAL STRIP-MCNC: 102 MG/DL (ref 74–99)
GLUCOSE SERPL-MCNC: 131 MG/DL (ref 74–99)
HCT VFR BLD AUTO: 28.8 % (ref 36–46)
HGB BLD-MCNC: 9.4 G/DL (ref 12–16)
IMM GRANULOCYTES # BLD AUTO: 0.08 X10*3/UL (ref 0–0.7)
IMM GRANULOCYTES NFR BLD AUTO: 0.5 % (ref 0–0.9)
LYMPHOCYTES # BLD AUTO: 1.58 X10*3/UL (ref 1.2–4.8)
LYMPHOCYTES NFR BLD AUTO: 8.9 %
MAGNESIUM SERPL-MCNC: 1.7 MG/DL (ref 1.6–2.4)
MCH RBC QN AUTO: 31.8 PG (ref 26–34)
MCHC RBC AUTO-ENTMCNC: 32.6 G/DL (ref 32–36)
MCV RBC AUTO: 97 FL (ref 80–100)
MONOCYTES # BLD AUTO: 0.69 X10*3/UL (ref 0.1–1)
MONOCYTES NFR BLD AUTO: 3.9 %
NEUTROPHILS # BLD AUTO: 15.02 X10*3/UL (ref 1.2–7.7)
NEUTROPHILS NFR BLD AUTO: 84.5 %
NRBC BLD-RTO: 0 /100 WBCS (ref 0–0)
PLATELET # BLD AUTO: 444 X10*3/UL (ref 150–450)
POTASSIUM SERPL-SCNC: 3.5 MMOL/L (ref 3.5–5.3)
PROT SERPL-MCNC: 6.6 G/DL (ref 6.4–8.2)
RBC # BLD AUTO: 2.96 X10*6/UL (ref 4–5.2)
SARS-COV-2 RNA RESP QL NAA+PROBE: NOT DETECTED
SODIUM SERPL-SCNC: 136 MMOL/L (ref 136–145)
WBC # BLD AUTO: 17.8 X10*3/UL (ref 4.4–11.3)

## 2023-11-13 PROCEDURE — 82947 ASSAY GLUCOSE BLOOD QUANT: CPT | Mod: 59

## 2023-11-13 PROCEDURE — 86140 C-REACTIVE PROTEIN: CPT | Performed by: EMERGENCY MEDICINE

## 2023-11-13 PROCEDURE — 85652 RBC SED RATE AUTOMATED: CPT | Performed by: EMERGENCY MEDICINE

## 2023-11-13 PROCEDURE — 87040 BLOOD CULTURE FOR BACTERIA: CPT | Mod: AHULAB | Performed by: NURSE PRACTITIONER

## 2023-11-13 PROCEDURE — 80053 COMPREHEN METABOLIC PANEL: CPT | Performed by: EMERGENCY MEDICINE

## 2023-11-13 PROCEDURE — 2500000004 HC RX 250 GENERAL PHARMACY W/ HCPCS (ALT 636 FOR OP/ED): Performed by: EMERGENCY MEDICINE

## 2023-11-13 PROCEDURE — 83735 ASSAY OF MAGNESIUM: CPT | Performed by: PHYSICIAN ASSISTANT

## 2023-11-13 PROCEDURE — 96361 HYDRATE IV INFUSION ADD-ON: CPT | Performed by: INTERNAL MEDICINE

## 2023-11-13 PROCEDURE — 84484 ASSAY OF TROPONIN QUANT: CPT | Performed by: EMERGENCY MEDICINE

## 2023-11-13 PROCEDURE — 82550 ASSAY OF CK (CPK): CPT | Performed by: PHYSICIAN ASSISTANT

## 2023-11-13 PROCEDURE — 99285 EMERGENCY DEPT VISIT HI MDM: CPT | Mod: 25 | Performed by: EMERGENCY MEDICINE

## 2023-11-13 PROCEDURE — 74177 CT ABD & PELVIS W/CONTRAST: CPT | Performed by: RADIOLOGY

## 2023-11-13 PROCEDURE — 96365 THER/PROPH/DIAG IV INF INIT: CPT | Mod: 59 | Performed by: INTERNAL MEDICINE

## 2023-11-13 PROCEDURE — 87636 SARSCOV2 & INF A&B AMP PRB: CPT | Performed by: NURSE PRACTITIONER

## 2023-11-13 PROCEDURE — 36415 COLL VENOUS BLD VENIPUNCTURE: CPT | Performed by: EMERGENCY MEDICINE

## 2023-11-13 PROCEDURE — G0378 HOSPITAL OBSERVATION PER HR: HCPCS

## 2023-11-13 PROCEDURE — 2500000001 HC RX 250 WO HCPCS SELF ADMINISTERED DRUGS (ALT 637 FOR MEDICARE OP): Performed by: NURSE PRACTITIONER

## 2023-11-13 PROCEDURE — 85025 COMPLETE CBC W/AUTO DIFF WBC: CPT | Performed by: EMERGENCY MEDICINE

## 2023-11-13 PROCEDURE — 83880 ASSAY OF NATRIURETIC PEPTIDE: CPT | Performed by: PHYSICIAN ASSISTANT

## 2023-11-13 PROCEDURE — 2550000001 HC RX 255 CONTRASTS: Performed by: INTERNAL MEDICINE

## 2023-11-13 PROCEDURE — 84484 ASSAY OF TROPONIN QUANT: CPT | Mod: 91 | Performed by: PHYSICIAN ASSISTANT

## 2023-11-13 PROCEDURE — 74177 CT ABD & PELVIS W/CONTRAST: CPT

## 2023-11-13 PROCEDURE — 2500000004 HC RX 250 GENERAL PHARMACY W/ HCPCS (ALT 636 FOR OP/ED): Performed by: NURSE PRACTITIONER

## 2023-11-13 PROCEDURE — 99222 1ST HOSP IP/OBS MODERATE 55: CPT | Performed by: NURSE PRACTITIONER

## 2023-11-13 RX ORDER — ACETAMINOPHEN 325 MG/1
650 TABLET ORAL EVERY 6 HOURS PRN
Status: DISCONTINUED | OUTPATIENT
Start: 2023-11-13 | End: 2023-11-14 | Stop reason: HOSPADM

## 2023-11-13 RX ORDER — ONDANSETRON 4 MG/1
4 TABLET, FILM COATED ORAL EVERY 8 HOURS PRN
Status: DISCONTINUED | OUTPATIENT
Start: 2023-11-13 | End: 2023-11-14 | Stop reason: HOSPADM

## 2023-11-13 RX ORDER — SODIUM CHLORIDE 9 MG/ML
125 INJECTION, SOLUTION INTRAVENOUS CONTINUOUS
Status: DISCONTINUED | OUTPATIENT
Start: 2023-11-13 | End: 2023-11-14 | Stop reason: HOSPADM

## 2023-11-13 RX ORDER — POLYETHYLENE GLYCOL 3350 17 G/17G
17 POWDER, FOR SOLUTION ORAL DAILY
Status: DISCONTINUED | OUTPATIENT
Start: 2023-11-13 | End: 2023-11-14 | Stop reason: HOSPADM

## 2023-11-13 RX ORDER — OXYCODONE HYDROCHLORIDE 5 MG/1
5 TABLET ORAL EVERY 6 HOURS PRN
Status: DISCONTINUED | OUTPATIENT
Start: 2023-11-13 | End: 2023-11-14 | Stop reason: HOSPADM

## 2023-11-13 RX ORDER — ONDANSETRON HYDROCHLORIDE 2 MG/ML
4 INJECTION, SOLUTION INTRAVENOUS EVERY 8 HOURS PRN
Status: DISCONTINUED | OUTPATIENT
Start: 2023-11-13 | End: 2023-11-14 | Stop reason: HOSPADM

## 2023-11-13 RX ORDER — CHOLECALCIFEROL (VITAMIN D3) 25 MCG
1000 TABLET ORAL DAILY
Status: DISCONTINUED | OUTPATIENT
Start: 2023-11-13 | End: 2023-11-14 | Stop reason: HOSPADM

## 2023-11-13 RX ORDER — CITALOPRAM 20 MG/1
80 TABLET, FILM COATED ORAL DAILY
Status: DISCONTINUED | OUTPATIENT
Start: 2023-11-13 | End: 2023-11-14 | Stop reason: HOSPADM

## 2023-11-13 RX ORDER — IBUPROFEN 600 MG/1
600 TABLET ORAL EVERY 6 HOURS PRN
Status: DISCONTINUED | OUTPATIENT
Start: 2023-11-13 | End: 2023-11-14 | Stop reason: HOSPADM

## 2023-11-13 RX ADMIN — Medication 1000 UNITS: at 20:18

## 2023-11-13 RX ADMIN — SODIUM CHLORIDE 125 ML/HR: 9 INJECTION, SOLUTION INTRAVENOUS at 20:21

## 2023-11-13 RX ADMIN — CITALOPRAM HYDROBROMIDE 80 MG: 20 TABLET ORAL at 20:18

## 2023-11-13 RX ADMIN — IOHEXOL 75 ML: 350 INJECTION, SOLUTION INTRAVENOUS at 22:05

## 2023-11-13 RX ADMIN — SODIUM CHLORIDE 1000 ML: 9 INJECTION, SOLUTION INTRAVENOUS at 14:35

## 2023-11-13 RX ADMIN — OXYCODONE HYDROCHLORIDE 5 MG: 5 TABLET ORAL at 20:18

## 2023-11-13 RX ADMIN — PIPERACILLIN SODIUM AND TAZOBACTAM SODIUM 3.38 G: 3; .375 INJECTION, SOLUTION INTRAVENOUS at 20:21

## 2023-11-13 SDOH — SOCIAL STABILITY: SOCIAL INSECURITY: ABUSE: ADULT

## 2023-11-13 SDOH — SOCIAL STABILITY: SOCIAL INSECURITY: HAS ANYONE EVER THREATENED TO HURT YOUR FAMILY OR YOUR PETS?: NO

## 2023-11-13 SDOH — SOCIAL STABILITY: SOCIAL INSECURITY: DOES ANYONE TRY TO KEEP YOU FROM HAVING/CONTACTING OTHER FRIENDS OR DOING THINGS OUTSIDE YOUR HOME?: NO

## 2023-11-13 SDOH — SOCIAL STABILITY: SOCIAL INSECURITY: ARE YOU OR HAVE YOU BEEN THREATENED OR ABUSED PHYSICALLY, EMOTIONALLY, OR SEXUALLY BY ANYONE?: NO

## 2023-11-13 SDOH — SOCIAL STABILITY: SOCIAL INSECURITY: DO YOU FEEL UNSAFE GOING BACK TO THE PLACE WHERE YOU ARE LIVING?: NO

## 2023-11-13 SDOH — SOCIAL STABILITY: SOCIAL INSECURITY: ARE THERE ANY APPARENT SIGNS OF INJURIES/BEHAVIORS THAT COULD BE RELATED TO ABUSE/NEGLECT?: NO

## 2023-11-13 SDOH — SOCIAL STABILITY: SOCIAL INSECURITY: WERE YOU ABLE TO COMPLETE ALL THE BEHAVIORAL HEALTH SCREENINGS?: YES

## 2023-11-13 SDOH — SOCIAL STABILITY: SOCIAL INSECURITY: HAVE YOU HAD THOUGHTS OF HARMING ANYONE ELSE?: NO

## 2023-11-13 SDOH — SOCIAL STABILITY: SOCIAL INSECURITY: DO YOU FEEL ANYONE HAS EXPLOITED OR TAKEN ADVANTAGE OF YOU FINANCIALLY OR OF YOUR PERSONAL PROPERTY?: NO

## 2023-11-13 ASSESSMENT — PAIN SCALES - GENERAL
PAINLEVEL_OUTOF10: 9
PAINLEVEL_OUTOF10: 0 - NO PAIN
PAINLEVEL_OUTOF10: 5 - MODERATE PAIN

## 2023-11-13 ASSESSMENT — COGNITIVE AND FUNCTIONAL STATUS - GENERAL
MOBILITY SCORE: 10
PATIENT BASELINE BEDBOUND: NO
EATING MEALS: A LOT
WALKING IN HOSPITAL ROOM: TOTAL
DRESSING REGULAR UPPER BODY CLOTHING: A LOT
STANDING UP FROM CHAIR USING ARMS: A LOT
CLIMB 3 TO 5 STEPS WITH RAILING: TOTAL
DRESSING REGULAR LOWER BODY CLOTHING: A LOT
MOVING TO AND FROM BED TO CHAIR: A LOT
TOILETING: A LOT
PERSONAL GROOMING: A LOT
DAILY ACTIVITIY SCORE: 12
MOVING FROM LYING ON BACK TO SITTING ON SIDE OF FLAT BED WITH BEDRAILS: A LOT
HELP NEEDED FOR BATHING: A LOT
TURNING FROM BACK TO SIDE WHILE IN FLAT BAD: A LOT

## 2023-11-13 ASSESSMENT — ACTIVITIES OF DAILY LIVING (ADL)
JUDGMENT_ADEQUATE_SAFELY_COMPLETE_DAILY_ACTIVITIES: YES
LACK_OF_TRANSPORTATION: NO
HEARING - RIGHT EAR: FUNCTIONAL
FEEDING YOURSELF: DEPENDENT
WALKS IN HOME: DEPENDENT
DRESSING YOURSELF: DEPENDENT
HEARING - LEFT EAR: FUNCTIONAL
BATHING: DEPENDENT
GROOMING: DEPENDENT
TOILETING: DEPENDENT
ADEQUATE_TO_COMPLETE_ADL: YES
PATIENT'S MEMORY ADEQUATE TO SAFELY COMPLETE DAILY ACTIVITIES?: YES

## 2023-11-13 ASSESSMENT — LIFESTYLE VARIABLES
AUDIT-C TOTAL SCORE: 0
HOW MANY STANDARD DRINKS CONTAINING ALCOHOL DO YOU HAVE ON A TYPICAL DAY: PATIENT DOES NOT DRINK
AUDIT-C TOTAL SCORE: 0
SKIP TO QUESTIONS 9-10: 1
HOW OFTEN DO YOU HAVE 6 OR MORE DRINKS ON ONE OCCASION: NEVER
HOW OFTEN DO YOU HAVE A DRINK CONTAINING ALCOHOL: NEVER

## 2023-11-13 ASSESSMENT — PAIN - FUNCTIONAL ASSESSMENT
PAIN_FUNCTIONAL_ASSESSMENT: 0-10

## 2023-11-13 ASSESSMENT — COLUMBIA-SUICIDE SEVERITY RATING SCALE - C-SSRS
1. IN THE PAST MONTH, HAVE YOU WISHED YOU WERE DEAD OR WISHED YOU COULD GO TO SLEEP AND NOT WAKE UP?: NO
6. HAVE YOU EVER DONE ANYTHING, STARTED TO DO ANYTHING, OR PREPARED TO DO ANYTHING TO END YOUR LIFE?: NO
2. HAVE YOU ACTUALLY HAD ANY THOUGHTS OF KILLING YOURSELF?: NO

## 2023-11-13 ASSESSMENT — PAIN DESCRIPTION - ORIENTATION: ORIENTATION: RIGHT;LEFT

## 2023-11-13 ASSESSMENT — PAIN DESCRIPTION - LOCATION: LOCATION: LEG

## 2023-11-13 NOTE — ED PROVIDER NOTES
HPI   Chief Complaint   Patient presents with    Numbness       38-year-old female complains of numbness and tingling especially in her arms and legs she is 2 weeks postpartum where she had emergent  for eclampsia.  She was doing fine until yesterday when the edema caused her to have painful movements especially in her legs and left hand.  She denies abdominal pain or other complaints.  She is still has had some minor vaginal bleeding as expected in her  site is                          [place] Coma Scale Score: 15         NIH Stroke Scale: 0          Patient History   Past Medical History:   Diagnosis Date    Allergy status to unspecified drugs, medicaments and biological substances     History of seasonal allergies     Past Surgical History:   Procedure Laterality Date    MR HEAD ANGIO WO IV CONTRAST  2023    MR HEAD ANGIO WO IV CONTRAST 2023 [facility] MRI    OTHER SURGICAL HISTORY  2019    No history of surgery     No family history on file.  Social History     Tobacco Use    Smoking status: Never    Smokeless tobacco: Never   Vaping Use    Vaping Use: Never used   Substance Use Topics    Alcohol use: Not Currently    Drug use: Never       Physical Exam   ED Triage Vitals [23 1014]   Temp Heart Rate Resp BP   37.2 °C (99 °F) 101 15 115/62      SpO2 Temp src Heart Rate Source Patient Position   98 % -- -- --      BP Location FiO2 (%)     -- --       Physical Exam    ED Course & MDM   Diagnoses as of 23 1743   Numbness and tingling   Edema, unspecified type   Syncope, unspecified syncope type   Postpartum care and examination       Medical Decision Making  I saw and evaluated the patient.  I personally obtained the key and critical portions of the history and physical exam or was physically present for key and critical portions performed by the resident/midlevel. I reviewed the resident's/midlevel's documentation and discussed the patient with the resident.  I agree with the  resident's medical decision making as documented in the resident's/midlevel's note. This note supercedes documentation by midlevel/resident.      Chief complaint: Numbness swelling    History of present illness: Patient is a 38-year-old female with history of recent pregnancy complicated by severe preeclampsia with subsequent  presenting to the emergency department with complaints of pain and swelling in her hands and feet.  The patient states that this is to the point that she is having difficulty buttoning her pants or pulling zippers.  Patient states that she feels like her hands are weak but it also hurts to close her hands.  Concern, the patient presents to the emergency department for further evaluation.    Physical examination: General: Patient is an age-appropriate well-appearing female resting comfortably in the examination table  Cardiovascular: Patient has a regular rate and rhythm  Lungs: Lungs are clear to auscultation bilaterally  Abdomen: Patient's abdomen is soft nontender nondistended.  Patient has normal bowel sounds. There is no guarding or rebound tenderness.  Neuro: Patient is alert she moves all 4 extremities spontaneously there are no lateralizing neurologic deficits cranial nerves III through XII are intact.    Medical decision making: Patient remained stable throughout her time in the emergency department.  CBC demonstrated a white cell count of 17.8 but no significant abnormalities Chem-7 and LFTs were all within normal limits.  Urinalysis demonstrated no significant abnormalities.  BMP was within normal limits well-developed ultra troponin and delta troponin were both within normal limits. The patient's EKG demonstrated a sinus tachycardia with a heart rate of 101 bpm isoelectric ST segments narrow QRS complexes and a QTc of 446    Patient presents to the emergency department with complaints of weakness and swelling in her bilateral lower extremities.  Work-up was performed as  above and demonstrated no significant abnormalities.  The patient was reassured.  I spoke with OB/GYN on-call regarding this patient's case they feel that her symptoms are likely related to something neurologic rather than postpartum and stated they can be consent consulted if the patient is admitted to the hospital for further evaluation.  I spoke with the hospitalist who agreed the patient could be admitted to their service.  Patient was admitted to the hospital in otherwise stable condition.        Procedure  Procedures     Jakub Pablo MD  11/16/23 2034       Jakub Pablo MD  11/16/23 2038       Jakub Pablo MD  11/16/23 2039       Jakub Pablo MD  12/17/23 1445

## 2023-11-13 NOTE — TELEPHONE ENCOUNTER
"2 weeks pp.  Pt paged on call this morning stating \"she feels paralyzed\" and can't feel her hands/feet.  Pt advised needs to go to the ED  "

## 2023-11-13 NOTE — ED TRIAGE NOTES
2WK POST PARTUM PT TO ED FORM HOME WITH C/O BILATERAL HAND AND FEET NUMBNESS. PT HAD EMERGENCY C SECTION D/T SEIZURE ACTIVITY 2WKS AGO. PT STATES NO SEIZURE ACTIVITY

## 2023-11-14 VITALS
BODY MASS INDEX: 29.68 KG/M2 | TEMPERATURE: 100 F | HEART RATE: 89 BPM | HEIGHT: 65 IN | SYSTOLIC BLOOD PRESSURE: 113 MMHG | WEIGHT: 178.13 LBS | OXYGEN SATURATION: 98 % | RESPIRATION RATE: 17 BRPM | DIASTOLIC BLOOD PRESSURE: 59 MMHG

## 2023-11-14 LAB
ALBUMIN SERPL BCP-MCNC: 2.7 G/DL (ref 3.4–5)
ALP SERPL-CCNC: 49 U/L (ref 33–110)
ALT SERPL W P-5'-P-CCNC: 12 U/L (ref 7–45)
ANION GAP SERPL CALC-SCNC: 15 MMOL/L (ref 10–20)
APPEARANCE UR: CLEAR
AST SERPL W P-5'-P-CCNC: 11 U/L (ref 9–39)
BACTERIA #/AREA URNS AUTO: ABNORMAL /HPF
BASOPHILS # BLD AUTO: 0.03 X10*3/UL (ref 0–0.1)
BASOPHILS NFR BLD AUTO: 0.2 %
BILIRUB SERPL-MCNC: 0.6 MG/DL (ref 0–1.2)
BILIRUB UR STRIP.AUTO-MCNC: NEGATIVE MG/DL
BUN SERPL-MCNC: 8 MG/DL (ref 6–23)
CALCIUM SERPL-MCNC: 8 MG/DL (ref 8.6–10.3)
CHLORIDE SERPL-SCNC: 106 MMOL/L (ref 98–107)
CO2 SERPL-SCNC: 21 MMOL/L (ref 21–32)
COLOR UR: YELLOW
CREAT SERPL-MCNC: 0.58 MG/DL (ref 0.5–1.05)
EOSINOPHIL # BLD AUTO: 0.4 X10*3/UL (ref 0–0.7)
EOSINOPHIL NFR BLD AUTO: 2.5 %
ERYTHROCYTE [DISTWIDTH] IN BLOOD BY AUTOMATED COUNT: 13.3 % (ref 11.5–14.5)
GFR SERPL CREATININE-BSD FRML MDRD: >90 ML/MIN/1.73M*2
GLUCOSE SERPL-MCNC: 110 MG/DL (ref 74–99)
GLUCOSE UR STRIP.AUTO-MCNC: NEGATIVE MG/DL
HCT VFR BLD AUTO: 26.7 % (ref 36–46)
HGB BLD-MCNC: 8.1 G/DL (ref 12–16)
HOLD SPECIMEN: NORMAL
IMM GRANULOCYTES # BLD AUTO: 0.08 X10*3/UL (ref 0–0.7)
IMM GRANULOCYTES NFR BLD AUTO: 0.5 % (ref 0–0.9)
KETONES UR STRIP.AUTO-MCNC: NEGATIVE MG/DL
LEUKOCYTE ESTERASE UR QL STRIP.AUTO: ABNORMAL
LYMPHOCYTES # BLD AUTO: 1.31 X10*3/UL (ref 1.2–4.8)
LYMPHOCYTES NFR BLD AUTO: 8.3 %
MCH RBC QN AUTO: 31 PG (ref 26–34)
MCHC RBC AUTO-ENTMCNC: 30.3 G/DL (ref 32–36)
MCV RBC AUTO: 102 FL (ref 80–100)
MONOCYTES # BLD AUTO: 0.56 X10*3/UL (ref 0.1–1)
MONOCYTES NFR BLD AUTO: 3.6 %
NEUTROPHILS # BLD AUTO: 13.32 X10*3/UL (ref 1.2–7.7)
NEUTROPHILS NFR BLD AUTO: 84.9 %
NITRITE UR QL STRIP.AUTO: NEGATIVE
NRBC BLD-RTO: 0 /100 WBCS (ref 0–0)
PH UR STRIP.AUTO: 5 [PH]
PLATELET # BLD AUTO: 358 X10*3/UL (ref 150–450)
POTASSIUM SERPL-SCNC: 3.5 MMOL/L (ref 3.5–5.3)
PROT SERPL-MCNC: 5.6 G/DL (ref 6.4–8.2)
PROT UR STRIP.AUTO-MCNC: NEGATIVE MG/DL
RBC # BLD AUTO: 2.61 X10*6/UL (ref 4–5.2)
RBC # UR STRIP.AUTO: ABNORMAL /UL
RBC #/AREA URNS AUTO: ABNORMAL /HPF
SODIUM SERPL-SCNC: 138 MMOL/L (ref 136–145)
SP GR UR STRIP.AUTO: 1.01
SQUAMOUS #/AREA URNS AUTO: ABNORMAL /HPF
UROBILINOGEN UR STRIP.AUTO-MCNC: <2 MG/DL
WBC # BLD AUTO: 15.7 X10*3/UL (ref 4.4–11.3)
WBC #/AREA URNS AUTO: ABNORMAL /HPF

## 2023-11-14 PROCEDURE — 96376 TX/PRO/DX INJ SAME DRUG ADON: CPT | Performed by: INTERNAL MEDICINE

## 2023-11-14 PROCEDURE — A4217 STERILE WATER/SALINE, 500 ML: HCPCS | Performed by: NURSE PRACTITIONER

## 2023-11-14 PROCEDURE — 87086 URINE CULTURE/COLONY COUNT: CPT | Mod: AHULAB | Performed by: NURSE PRACTITIONER

## 2023-11-14 PROCEDURE — 96375 TX/PRO/DX INJ NEW DRUG ADDON: CPT | Performed by: INTERNAL MEDICINE

## 2023-11-14 PROCEDURE — 36415 COLL VENOUS BLD VENIPUNCTURE: CPT | Performed by: NURSE PRACTITIONER

## 2023-11-14 PROCEDURE — 2500000001 HC RX 250 WO HCPCS SELF ADMINISTERED DRUGS (ALT 637 FOR MEDICARE OP): Performed by: NURSE PRACTITIONER

## 2023-11-14 PROCEDURE — 85025 COMPLETE CBC W/AUTO DIFF WBC: CPT | Performed by: NURSE PRACTITIONER

## 2023-11-14 PROCEDURE — 81001 URINALYSIS AUTO W/SCOPE: CPT | Performed by: NURSE PRACTITIONER

## 2023-11-14 PROCEDURE — 99232 SBSQ HOSP IP/OBS MODERATE 35: CPT | Performed by: NURSE PRACTITIONER

## 2023-11-14 PROCEDURE — 2500000004 HC RX 250 GENERAL PHARMACY W/ HCPCS (ALT 636 FOR OP/ED): Performed by: NURSE PRACTITIONER

## 2023-11-14 PROCEDURE — 80053 COMPREHEN METABOLIC PANEL: CPT | Performed by: NURSE PRACTITIONER

## 2023-11-14 PROCEDURE — G0378 HOSPITAL OBSERVATION PER HR: HCPCS

## 2023-11-14 RX ORDER — AMOXICILLIN AND CLAVULANATE POTASSIUM 875; 125 MG/1; MG/1
1 TABLET, FILM COATED ORAL 2 TIMES DAILY
Qty: 14 TABLET | Refills: 0 | Status: SHIPPED | OUTPATIENT
Start: 2023-11-14 | End: 2023-11-14 | Stop reason: HOSPADM

## 2023-11-14 RX ORDER — FOLIC ACID 0.4 MG
0.8 TABLET ORAL DAILY
Status: DISCONTINUED | OUTPATIENT
Start: 2023-11-14 | End: 2023-11-14

## 2023-11-14 RX ORDER — AMOXICILLIN AND CLAVULANATE POTASSIUM 500; 125 MG/1; MG/1
1 TABLET, FILM COATED ORAL 3 TIMES DAILY
Qty: 21 TABLET | Refills: 0 | Status: SHIPPED | OUTPATIENT
Start: 2023-11-14 | End: 2023-11-21

## 2023-11-14 RX ORDER — FLUCONAZOLE 150 MG/1
150 TABLET ORAL SEE ADMIN INSTRUCTIONS
Qty: 2 TABLET | Refills: 0 | Status: SHIPPED | OUTPATIENT
Start: 2023-11-14 | End: 2023-12-11 | Stop reason: ALTCHOICE

## 2023-11-14 RX ORDER — MULTIVIT-MIN/IRON FUM/FOLIC AC 7.5 MG-4
1 TABLET ORAL DAILY
Status: DISCONTINUED | OUTPATIENT
Start: 2023-11-14 | End: 2023-11-14

## 2023-11-14 RX ADMIN — PIPERACILLIN SODIUM AND TAZOBACTAM SODIUM 3.38 G: 3; .375 INJECTION, SOLUTION INTRAVENOUS at 14:29

## 2023-11-14 RX ADMIN — PIPERACILLIN SODIUM AND TAZOBACTAM SODIUM 3.38 G: 3; .375 INJECTION, SOLUTION INTRAVENOUS at 08:03

## 2023-11-14 RX ADMIN — PIPERACILLIN SODIUM AND TAZOBACTAM SODIUM 3.38 G: 3; .375 INJECTION, SOLUTION INTRAVENOUS at 02:17

## 2023-11-14 RX ADMIN — CITALOPRAM HYDROBROMIDE 80 MG: 20 TABLET ORAL at 08:19

## 2023-11-14 RX ADMIN — VANCOMYCIN HYDROCHLORIDE 1250 MG: 10 INJECTION, POWDER, LYOPHILIZED, FOR SOLUTION INTRAVENOUS at 12:20

## 2023-11-14 RX ADMIN — ACETAMINOPHEN 650 MG: 325 TABLET ORAL at 12:21

## 2023-11-14 RX ADMIN — OXYCODONE HYDROCHLORIDE 5 MG: 5 TABLET ORAL at 08:18

## 2023-11-14 RX ADMIN — VANCOMYCIN HYDROCHLORIDE 1250 MG: 10 INJECTION, POWDER, LYOPHILIZED, FOR SOLUTION INTRAVENOUS at 00:22

## 2023-11-14 RX ADMIN — Medication 1000 UNITS: at 08:19

## 2023-11-14 ASSESSMENT — COGNITIVE AND FUNCTIONAL STATUS - GENERAL
PERSONAL GROOMING: A LOT
DAILY ACTIVITIY SCORE: 12
DRESSING REGULAR UPPER BODY CLOTHING: A LOT
CLIMB 3 TO 5 STEPS WITH RAILING: TOTAL
STANDING UP FROM CHAIR USING ARMS: A LOT
TURNING FROM BACK TO SIDE WHILE IN FLAT BAD: A LOT
MOVING FROM LYING ON BACK TO SITTING ON SIDE OF FLAT BED WITH BEDRAILS: A LOT
WALKING IN HOSPITAL ROOM: TOTAL
MOBILITY SCORE: 10
EATING MEALS: A LOT
DRESSING REGULAR LOWER BODY CLOTHING: A LOT
MOVING TO AND FROM BED TO CHAIR: A LOT
HELP NEEDED FOR BATHING: A LOT
TOILETING: A LOT

## 2023-11-14 ASSESSMENT — ACTIVITIES OF DAILY LIVING (ADL): LACK_OF_TRANSPORTATION: NO

## 2023-11-14 ASSESSMENT — PAIN SCALES - GENERAL: PAINLEVEL_OUTOF10: 8

## 2023-11-14 NOTE — PROGRESS NOTES
"Vancomycin Dosing by Pharmacy- INITIAL    Genna Garner is a 38 y.o. year old female who Pharmacy has been consulted for vancomycin dosing for other intra-abdominal . Based on the patient's indication and renal status this patient will be dosed based on a goal AUC of 400-600.     Renal function is currently stable.    Visit Vitals  /77   Pulse 89   Temp 37.1 °C (98.7 °F) (Oral)   Resp 20        Lab Results   Component Value Date    CREATININE 0.70 11/13/2023    CREATININE 0.67 11/01/2023    CREATININE 0.57 11/01/2023    CREATININE 0.45 (L) 10/31/2023        Patient weight is No results found for: \"PTWEIGHT\"    No results found for: \"CULTURE\"     I/O last 3 completed shifts:  In: 1000 (13 mL/kg) [IV Piggyback:1000]  Out: - (0 mL/kg)   Weight: 77.1 kg   [unfilled]    Lab Results   Component Value Date    PATIENTTEMP 37.0 10/31/2023    PATIENTTEMP 37.0 10/31/2023          Assessment/Plan     Patient will not be given a loading dose.  Will initiate vancomycin maintenance,  1250 mg every 12 hours.    This dosing regimen is predicted by [employer] to result in the following pharmacokinetic parameters:  Exposure target: AUC24 (range)400-600 mg/L.hr   AUC24,ss: 504 mg/L.hr  Probability of AUC24 > 400: 73 %  Ctrough,ss: 14.7 mg/L  Probability of Ctrough,ss > 20: 27 %  Probability of nephrotoxicity (Lodise GABRIELLE 2009): 10 %    Follow-up level will be ordered on 11/15 with am labs unless clinically indicated sooner.  Will continue to monitor renal function daily while on vancomycin and order serum creatinine at least every 48 hours if not already ordered.  Follow for continued vancomycin needs, clinical response, and signs/symptoms of toxicity.       Beth Baker RP       "

## 2023-11-14 NOTE — CARE PLAN
Problem: Pain  Goal: My pain/discomfort is manageable  Outcome: Met     Problem: Safety  Goal: Patient will be injury free during hospitalization  Outcome: Met     Problem: Safety  Goal: I will remain free of falls  Outcome: Met     Problem: Daily Care  Goal: Daily care needs are met  Outcome: Met     Problem: Psychosocial Needs  Goal: Demonstrates ability to cope with hospitalization/illness  Outcome: Met   The patient's goals for the shift include      The clinical goals for the shift include Pt will remain pain free throughout the shift    Over the shift, the patient did not make progress toward the following goals. Barriers to progression include pain. Recommendations to address these barriers include comfort and pain mangement

## 2023-11-14 NOTE — PROGRESS NOTES
11/14/23 0950   Current Planned Discharge Disposition   Current Planned Discharge Disposition Home

## 2023-11-14 NOTE — CARE PLAN
Problem: Pain  Goal: My pain/discomfort is manageable  Outcome: Progressing     Problem: Safety  Goal: Patient will be injury free during hospitalization  Outcome: Progressing  Goal: I will remain free of falls  Outcome: Progressing     Problem: Daily Care  Goal: Daily care needs are met  Outcome: Progressing     Problem: Psychosocial Needs  Goal: Demonstrates ability to cope with hospitalization/illness  Outcome: Progressing  Goal: Collaborate with me, my family, and caregiver to identify my specific goals  Outcome: Progressing  Flowsheets (Taken 11/13/2023 2040)  Cultural Requests During Hospitalization: no  Spiritual Requests During Hospitalization: no     Problem: Discharge Barriers  Goal: My discharge needs are met  Outcome: Progressing   The patient's goals for the shift include      The clinical goals for the shift include Pt will remain pain free throughout the shift

## 2023-11-14 NOTE — PROGRESS NOTES
23 0945   Discharge Planning   Living Arrangements Spouse/significant other   Support Systems Spouse/significant other;Parent;Family members   Type of Residence Private residence   Number of Stairs to Enter Residence 3   Number of Stairs Within Residence 12   Do you have animals or pets at home? No   Patient expects to be discharged to: Home   Does the patient need discharge transport arranged? No   Financial Resource Strain   How hard is it for you to pay for the very basics like food, housing, medical care, and heating? Not hard   Transportation Needs   In the past 12 months, has lack of transportation kept you from medical appointments or from getting medications? no   In the past 12 months, has lack of transportation kept you from meetings, work, or from getting things needed for daily living? No     Met with patient and  at bedside to  discuss her preferences for care upon discharge. Discussed how patient manages health at home. Lives with her , 3 year old boy and  in a house .  Patient's  son had to stay in the NICU for 10 days.  Patient just got home 2 days ago and then her symptoms started on . Independent in all ADLs.  No home O2, dialysis, or assistive devices.  No additional resources or needs identified. Reviewed today's plan of care.  Patient verbalized understanding as evidenced by teach back method. Patient plans to return home at discharge & follow up with her PCP.   will provide transportation home upon discharge.  Discussed the need for possible pt/ot-patient does not think she needs it but offered it as an option depending on how she feels.  Patient was not having any mobility issues prior to giving birth or after the birth.  KIAN Stratton RN TCC

## 2023-11-14 NOTE — DISCHARGE SUMMARY
Discharge Diagnosis  Leukocytosis    Issues Requiring Follow-Up  Extremity swelling and UTI    Discharge Meds     Your medication list        START taking these medications        Instructions Last Dose Given Next Dose Due   amoxicillin-pot clavulanate 875-125 mg tablet  Commonly known as: Augmentin      Take 1 tablet (875 mg) by mouth 2 times a day for 7 days.              CONTINUE taking these medications        Instructions Last Dose Given Next Dose Due   acetaminophen 500 mg tablet  Commonly known as: Tylenol      Take 2 tablets (1,000 mg) by mouth every 6 hours if needed for mild pain (1 - 3).       cholecalciferol 25 MCG (1000 UT) tablet  Commonly known as: Vitamin D-3           citalopram 40 mg tablet  Commonly known as: CeleXA            mg tablet  Generic drug: ibuprofen      Take 1 tablet (600 mg) by mouth every 6 hours if needed for mild pain (1 - 3).       polyethylene glycol 17 gram/dose powder  Commonly known as: Glycolax, Miralax      Take 17 g by mouth once daily.       Prenatal 27 mg iron-800 mcg folic acid tablet  Generic drug: prenatal vitamin (iron-folic)                  STOP taking these medications      Narcan 4 mg/0.1 mL nasal spray  Generic drug: naloxone                  Where to Get Your Medications        These medications were sent to [facility]/pharmacy #[id number] - [place], [place] - [address]  [address], [facility] [address]      Hours: 24-hours Phone: [phone]   amoxicillin-pot clavulanate 875-125 mg tablet         Test Results Pending At Discharge  Pending Labs       Order Current Status    Extra Urine Gray Tube In process    Urinalysis with Reflex Microscopic and Culture In process    Urine Culture In process    Blood Culture Preliminary result    Blood Culture Preliminary result            Hospital Course   Principal Problem:    Leukocytosis  -CBC wbc 17.8 to 15.7 today  -CMP   -Sed rate 83  -CRP 9.23  -Trop <3  -Mag 1.70  -CK 34  -BNP  33  -Blood culture x2  -Covid test neg  -Flu test neg  -Ua positive leukocytes and hydronephrosis  -urine culture  -zosyn 3.375 gm ivpb q6h  -vancomycin ivpb q 24 hrs pharmacy to calculate  -CT abd/pelvis some hydronephrosis  -ID consult  -IV normal saline 125 hr     DVT prophylaxis  -ambulate     Labs/Testing reviewed     Interdisciplinary team rounding completed with hospitalist, nurse, TCC     NP discussed plan and lab/testing results with Dr. Carlisle     Continue on antibiotics  Dr. Carter feels its from UTI and pyelonephritis tx with 7 day course of augmentin bid     * 45 minutes total spent on patient's care today; >50% time spent on counseling/coordination of care          Pertinent Physical Exam At Time of Discharge  Physical Exam  General: Vital signs stable, Pt is alert, no acute distress  Eyes: Conjunctiva normal, PERRL, EOMs intact  HENMT: Normocephalic, atraumatic, external ears and nose normal, no scars or masses.  No mastoid tenderness. Trachea is midline. No meningeal signs, negative Kernig and Brudzinski, moves neck freely.  No sinus tenderness  Resp: Respiratory effort is normal, no retractions, no stridor. Lungs CTA, no wheezes or rhonchi  CV: Heart is regular rate and rhythm.   Skin: No evidence of trauma, skin is warm and dry. No rashes, lesions or ulcers. POSITIVE incision from child birth no drainage or redness  Skel: full range of motion of upper and lower extremities. Patient is up walking to bathroom and is using her hands to use phone,  she feels much better then earlier   Neuro: Normal gait, CN II-XII intact, no motor or sensory changes.  Psych: Alert and oriented ×3, judgment is appropriate, normal mood and affect     Outpatient Follow-Up  No future appointments.      Ana Finn, APRN-CNP

## 2023-11-14 NOTE — DISCHARGE INSTRUCTIONS
Urine showed a UTI that is probably where you are getting the WBC elevation.  We will treat you with antibiotics and follow up with your OB    OK to take medications with breast feeding but if you feel better you can pump and dump while taking the medications

## 2023-11-14 NOTE — CONSULTS
"INFECTIOUS DISEASE INPATIENT INITIAL CONSULTATION    Referred By: Zehra Pereyra    Reason For Consult: leukocytosis    HPI:  This is a 38 y.o. female with PMH of recent  2 weeks ago who presented with various issues.    While admitted for labor at [facility] initially. She developed a seizure and there was fetal bradycardia. She had urgent . Sent to [facility] for further evaluation. No structural willie issue identified and no further seizures. Went home and had been doing well. Denies feeling fevers/chills. No cough, shortness of breath, chest pain, rhinorrhea, sore throat, n/v/c/d, abd pain, urinary issues, vaginal discharge. Vaginal bleeding has stopped at this point.  is healing fine. She was having difficulty walking and feeling some numbness in legs but this is resolved now. Has no back pain/neck pain/radicular pain. Main issue currently is hand swelling with limited mobility in the fingers. This is getting better now also though.    Here noted to have leukocytosis. Is on empiric abx. UA sent after abx with some pyuria. CT A/P showing normal post-gravid changes, also some right hydronephrosis.    Allergies:  Bee pollen, Cat dander, and Grass pollen     Vitals (Last 24 Hours):  Heart Rate:  [85-94]   Temp:  [37 °C (98.6 °F)-37.8 °C (100 °F)]   Resp:  [17-20]   BP: (108-126)/(58-77)   Height:  [165.1 cm (5' 5\")]   Weight:  [80.8 kg (178 lb 2.1 oz)]   SpO2:  [95 %-100 %]      PHYSICAL EXAM:  Gen - NAD  Heart - RRR, no murmurs  Lungs - clear bilaterally, no wheezing  Abd - soft, no ttp, BS present  Ext - no LE edema  Hands - mild swelling with some limited ROM but no signs of infection/cellulitis  Neuro - no focal deficits  Skin - no rash    MEDS:    Current Facility-Administered Medications:     acetaminophen (Tylenol) tablet 650 mg, 650 mg, oral, q6h PRN, Ana Finn, APRN-CNP, 650 mg at 23 1221    cholecalciferol (Vitamin D-3) tablet 1,000 Units, 1,000 Units, oral, Daily, Ana" Liliya Wolinski, APRN-CNP, 1,000 Units at 11/14/23 0819    citalopram (CeleXA) tablet 80 mg, 80 mg, oral, Daily, ANNE Peck, 80 mg at 11/14/23 0819    ibuprofen tablet 600 mg, 600 mg, oral, q6h PRN, ANNE Peck    ondansetron (Zofran) tablet 4 mg, 4 mg, oral, q8h PRN **OR** ondansetron (Zofran) injection 4 mg, 4 mg, intravenous, q8h PRN, ANNE Peck    oxyCODONE (Roxicodone) immediate release tablet 5 mg, 5 mg, oral, q6h PRN, ANNE Peck, 5 mg at 11/14/23 0818    piperacillin-tazobactam-dextrose (Zosyn) IV 3.375 g, 3.375 g, intravenous, q6h, ANNE Peck, Stopped at 11/14/23 1459    polyethylene glycol (Glycolax, Miralax) packet 17 g, 17 g, oral, Daily, ANNE Peck    prenatal vitamin (iron-folic) tablet 1 tablet, 1 tablet, oral, Daily, Toni Fuentes, Cherise    sodium chloride 0.9% infusion, 125 mL/hr, intravenous, Continuous, ANNE Peck, Last Rate: 125 mL/hr at 11/13/23 2021, 125 mL/hr at 11/13/23 2021    vancomycin (Vancocin) in dextrose 5% 250 mL IV 1,250 mg, 1,250 mg, intravenous, q12h, ANNE Peck, 1,250 mg at 11/14/23 1220    Current Outpatient Medications:     acetaminophen (Tylenol) 500 mg tablet, Take 2 tablets (1,000 mg) by mouth every 6 hours if needed for mild pain (1 - 3)., Disp: 60 tablet, Rfl: 0    amoxicillin-pot clavulanate (Augmentin) 500-125 mg tablet, Take 1 tablet (500 mg) by mouth 3 times a day for 7 days., Disp: 21 tablet, Rfl: 0    cholecalciferol (Vitamin D-3) 25 MCG (1000 UT) tablet, Take by mouth., Disp: , Rfl:     citalopram (CeleXA) 40 mg tablet, Take 2 tablets (80 mg) by mouth once daily., Disp: , Rfl:     fluconazole (Diflucan) 150 mg tablet, Take 1 tablet (150 mg) by mouth see administration instructions for 2 doses. Take one tab now. Repeat in 7 days if symptoms persist., Disp: 2 tablet, Rfl: 0    ibuprofen 600 mg tablet, Take 1 tablet (600 mg) by mouth every 6 hours if  needed for mild pain (1 - 3)., Disp: 60 tablet, Rfl: 0    polyethylene glycol (Glycolax, Miralax) 17 gram/dose powder, Take 17 g by mouth once daily., Disp: 476 g, Rfl: 0    prenatal vitamin, iron-folic, (Prenatal) 27 mg iron-800 mcg folic acid tablet, Take 1 tablet by mouth., Disp: , Rfl:      LABS:  Lab Results   Component Value Date    WBC 15.7 (H) 11/14/2023    HGB 8.1 (L) 11/14/2023    HCT 26.7 (L) 11/14/2023     (H) 11/14/2023     11/14/2023      Results from last 72 hours   Lab Units 11/14/23  0523   SODIUM mmol/L 138   POTASSIUM mmol/L 3.5   CHLORIDE mmol/L 106   CO2 mmol/L 21   BUN mg/dL 8   CREATININE mg/dL 0.58   GLUCOSE mg/dL 110*   CALCIUM mg/dL 8.0*   ANION GAP mmol/L 15   EGFR mL/min/1.73m*2 >90     Results from last 72 hours   Lab Units 11/14/23  0523   ALK PHOS U/L 49   BILIRUBIN TOTAL mg/dL 0.6   PROTEIN TOTAL g/dL 5.6*   ALT U/L 12   AST U/L 11   ALBUMIN g/dL 2.7*     Estimated Creatinine Clearance: 125 mL/min (by C-G formula based on SCr of 0.58 mg/dL).  C-Reactive Protein   Date/Time Value Ref Range Status   11/13/2023 11:22 AM 9.23 (H) <1.00 mg/dL Final     Sedimentation Rate   Date/Time Value Ref Range Status   11/13/2023 05:26 PM 83 (H) 0 - 20 mm/h Final        IMAGING:  CT A/P  IMPRESSION:  Small indeterminate fluid collection in the lower ventral abdominal  wall as well as mild pelvic hemorrhagic ascites with post gravid  appearance of the uterus. Mild right hydroureteronephrosis also  likely related to post gravid state/uterine enlargement.      ASSESSMENT/PLAN:    Fever/Leukocytosis  Hand Swelling - resolving    I don't think there is a neurologic infection or complication related to her epidural. She has no focal neurologic deficits. I don't see a need for MRI of the spine, this is highly likely to be normal or show a minor change related to the epidural.    Given elevated WBC improving with abx and UA with pyuria along with recent pregnancy and hydro noted I think would be  fine to treat as a UTI. OK to discharge with PO Amoxicillin 500mg TID for 1 week. Would be OK to breastfeed with this or can pump/dump and use formula instead. She has been using a combination right now of breastfeeding and formula. Urine cx is sent - should follow up with her PCP or OB for this. Of note this is after abx and might be negative anyway.    Will sign off. Please call back with questions. Thanks! D/w NP Ana Carter MD  [facility]  Office #[phone]

## 2023-11-14 NOTE — PROGRESS NOTES
Genna Garner is a 38 y.o. female on day 0 of admission presenting with Leukocytosis.    Subjective   Patient is feeling a little bit better today she is able to lift her leg slightly off the bed.  She is still not tried to get up and walk.  She still has some weakness in her upper extremities but has been able to articulate herself alone.  She has been taking Tylenol for pain.  Still waiting for ID to see patient.     ROS  Gen: POSITIVE fatigue, POSITIVE fever, sweats.  Head: No headache, trauma.  Eyes: No vision loss, double vision, drainage, eye pain.  ENT: No hearing changes, pain, epistaxis, congestion  Cardiac: No chest pain  Pulmonary: No shortness of breath,  pleuritic pain,   Heme/lymph: No swollen glands  GI: No abdominal pain, nausea, vomiting, diarrhea  : No  dysuria, frequency, urgency, hematuria  Musculoskeletal: No limb pain, joint pain, back pain, joint swelling or stiffness.  Skin: No rashes, pruritus, lumps, lesions.  Neuro: No Numbness, tingling, POSITIVE upper and lower extremity weakness.  Psych: No  anxiety     Review of systems is otherwise negative unless stated above or in history of present illness.    Objective     Physical Exam  General: Vital signs stable, Pt is alert, no acute distress  Eyes: Conjunctiva normal, PERRL, EOMs intact  HENMT: Normocephalic, atraumatic, external ears and nose normal, no scars or masses.  No mastoid tenderness. Trachea is midline. No meningeal signs, negative Kernig and Brudzinski, moves neck freely.  No sinus tenderness  Resp: Respiratory effort is normal, no retractions, no stridor. Lungs CTA, no wheezes or rhonchi  CV: Heart is regular rate and rhythm.   GI: Normal abdomin with no pain on palpation and normal bowel sounds  Skin: POSITIVE suprapubic incision with no drainage or erythema,    Skel: POSITIVE limited range of motion of upper and lower extremities.   Neuro:  ABNormal gait with weakness when getting up cannot bear weight on lower extremities,   ", CN II-XII intact, POSITIVE  motor no sensory changes.  Psych: Alert and oriented ×3, judgment is appropriate, normal mood and affect      Last Recorded Vitals  Blood pressure 113/59, pulse 89, temperature 37.8 °C (100 °F), temperature source Temporal, resp. rate 17, height 1.651 m (5' 5\"), weight 80.8 kg (178 lb 2.1 oz), last menstrual period 01/28/2023, SpO2 98 %, currently breastfeeding.  Intake/Output last 3 Shifts:  I/O last 3 completed shifts:  In: 1000 (12.4 mL/kg) [IV Piggyback:1000]  Out: - (0 mL/kg)   Weight: 80.8 kg     Relevant Results  Results for orders placed or performed during the hospital encounter of 11/13/23 (from the past 24 hour(s))   POCT GLUCOSE   Result Value Ref Range    POCT Glucose 102 (H) 74 - 99 mg/dL   Sedimentation rate, automated   Result Value Ref Range    Sedimentation Rate 83 (H) 0 - 20 mm/h   Sars-CoV-2 PCR, Symptomatic   Result Value Ref Range    Coronavirus 2019, PCR Not Detected Not Detected   Influenza A, and B PCR   Result Value Ref Range    Flu A Result Not Detected Not Detected    Flu B Result Not Detected Not Detected   Blood Culture    Specimen: Peripheral Venipuncture; Blood culture   Result Value Ref Range    Blood Culture Loaded on Instrument - Culture in progress    Blood Culture    Specimen: Peripheral Venipuncture; Blood culture   Result Value Ref Range    Blood Culture Loaded on Instrument - Culture in progress    CBC and Auto Differential   Result Value Ref Range    WBC 15.7 (H) 4.4 - 11.3 x10*3/uL    nRBC 0.0 0.0 - 0.0 /100 WBCs    RBC 2.61 (L) 4.00 - 5.20 x10*6/uL    Hemoglobin 8.1 (L) 12.0 - 16.0 g/dL    Hematocrit 26.7 (L) 36.0 - 46.0 %     (H) 80 - 100 fL    MCH 31.0 26.0 - 34.0 pg    MCHC 30.3 (L) 32.0 - 36.0 g/dL    RDW 13.3 11.5 - 14.5 %    Platelets 358 150 - 450 x10*3/uL    Neutrophils % 84.9 40.0 - 80.0 %    Immature Granulocytes %, Automated 0.5 0.0 - 0.9 %    Lymphocytes % 8.3 13.0 - 44.0 %    Monocytes % 3.6 2.0 - 10.0 %    Eosinophils % 2.5 " 0.0 - 6.0 %    Basophils % 0.2 0.0 - 2.0 %    Neutrophils Absolute 13.32 (H) 1.20 - 7.70 x10*3/uL    Immature Granulocytes Absolute, Automated 0.08 0.00 - 0.70 x10*3/uL    Lymphocytes Absolute 1.31 1.20 - 4.80 x10*3/uL    Monocytes Absolute 0.56 0.10 - 1.00 x10*3/uL    Eosinophils Absolute 0.40 0.00 - 0.70 x10*3/uL    Basophils Absolute 0.03 0.00 - 0.10 x10*3/uL   Comprehensive Metabolic Panel   Result Value Ref Range    Glucose 110 (H) 74 - 99 mg/dL    Sodium 138 136 - 145 mmol/L    Potassium 3.5 3.5 - 5.3 mmol/L    Chloride 106 98 - 107 mmol/L    Bicarbonate 21 21 - 32 mmol/L    Anion Gap 15 10 - 20 mmol/L    Urea Nitrogen 8 6 - 23 mg/dL    Creatinine 0.58 0.50 - 1.05 mg/dL    eGFR >90 >60 mL/min/1.73m*2    Calcium 8.0 (L) 8.6 - 10.3 mg/dL    Albumin 2.7 (L) 3.4 - 5.0 g/dL    Alkaline Phosphatase 49 33 - 110 U/L    Total Protein 5.6 (L) 6.4 - 8.2 g/dL    AST 11 9 - 39 U/L    Bilirubin, Total 0.6 0.0 - 1.2 mg/dL    ALT 12 7 - 45 U/L       Imaging Results  CT abdomen pelvis w IV contrast    Result Date: 2023  Interpreted By:  Isela Durbin, STUDY: CT ABDOMEN PELVIS W IV CONTRAST;  2023 10:10 pm   INDICATION: Signs/Symptoms:elevated wbc recent abd surgery. Approximately 2 weeks status post emergency  due to hypotension and seizure-like activity.   COMPARISON: None.   ACCESSION NUMBER(S): [id number]   ORDERING CLINICIAN: KM PETERSEN   TECHNIQUE: Axial CT images of the abdomen and pelvis with coronal and sagittal reconstructed images obtained after intravenous administration of 75 mL Omnipaque 350.   FINDINGS: LOWER CHEST: No acute abnormality of the lung bases. Bibasilar opacities likely atelectasis. BONES: No acute osseous abnormality. ABDOMINAL WALL: Small fluid collection measuring approximately 1.1 x 3.4 cm in the ventral lower abdominal wall compatible with recent surgery, of uncertain sterility. Mild adjacent subcutaneous stranding.   ABDOMEN:   LIVER: Within normal limits. BILE DUCTS:  Normal caliber. GALLBLADDER: No calcified gallstones. No wall thickening. PANCREAS: Within normal limits. SPLEEN: Within normal limits. ADRENALS: Within normal limits. KIDNEYS and URETERS: Mild right hydroureteronephrosis without obstructing calculus. Grossly symmetric renal enhancement.     VESSELS: No aortic aneurysm. RETROPERITONEUM: No pathologically enlarged retroperitoneal lymph nodes.   PELVIS:   REPRODUCTIVE ORGANS: Enlarged uterus compatible with recent post gravid state. Mild adjacent slightly hyperdense stranding noted in the ventral abdominopelvic region. BLADDER: Within normal limits.   BOWEL: No dilated bowel. Moderate stool burden. Normal appendix. PERITONEUM: No discrete fluid collection or free air.       Small indeterminate fluid collection in the lower ventral abdominal wall as well as mild pelvic hemorrhagic ascites with post gravid appearance of the uterus. Mild right hydroureteronephrosis also likely related to post gravid state/uterine enlargement.   MACRO: None   Signed by: Isela Durbin 11/13/2023 10:59 PM Dictation workstation:   [id number]      Medications:  cholecalciferol, 1,000 Units, oral, Daily  citalopram, 80 mg, oral, Daily  piperacillin-tazobactam, 3.375 g, intravenous, q6h  polyethylene glycol, 17 g, oral, Daily  prenatal vitamin (iron-folic), 1 tablet, oral, Daily  vancomycin, 1,250 mg, intravenous, q12h       PRN medications: acetaminophen, ibuprofen, ondansetron **OR** ondansetron, oxyCODONE     Assessment/Plan     Principal Problem:    Leukocytosis  -CBC wbc 17.8 to 15.7 today  -CMP   -Sed rate 83  -CRP 9.23  -Trop <3  -Mag 1.70  -CK 34  -BNP 33  -Blood culture x2  -Covid test neg  -Flu test neg  -Ua pending  -urine culture  -zosyn 3.375 gm ivpb q6h  -vancomycin ivpb q 24 hrs pharmacy to calculate  -CT abd/pelvis no abnormal findings  -ID consult  -IV normal saline 125 hr     DVT prophylaxis  -ambulate    Labs/Testing reviewed    Interdisciplinary team rounding  completed with hospitalist, nurse, TCC    NP discussed plan and lab/testing results with Dr. Carlisle    Continue on antibiotics  Dr. Carter to see patient today    * 45 minutes total spent on patient's care today; >50% time spent on counseling/coordination of care    Ana Finn, APRN-CNP

## 2023-11-15 LAB — BACTERIA UR CULT: NO GROWTH

## 2023-11-18 LAB
BACTERIA BLD CULT: NORMAL
BACTERIA BLD CULT: NORMAL

## 2023-11-27 NOTE — PROGRESS NOTES
ANTICOAGULATION MANAGEMENT     Salome Saeed 82 year old female is on warfarin with supratherapeutic INR result. (Goal INR 2.0-3.0)    Recent labs: (last 7 days)     11/27/23  1417   INR 4.4*       ASSESSMENT     Warfarin Lab Questionnaire    Warfarin Doses Last 7 Days      11/27/2023     2:13 PM   Dose in Tablet or Mg   TAB or MG? tablet (tab)     Warfarin dosing verbally confirmed with patient-- taking as instructed        11/27/2023   Warfarin Lab Questionnaire   Missed doses within past 14 days? No patient uses a pillbox so does not think she had any extra doses   Changes in diet or alcohol within past 14 days? No Patient reports decreased green veggies,    Medication changes since last result? No   Injuries or illness since last result? No   New shortness of breath, severe headaches or sudden changes in vision since last result? No   Abnormal bleeding since last result? No   Upcoming surgery, procedure? No     Previous result: Therapeutic last visit; previously outside of goal range, warfarin maintenance dose was decreased 10% on 10/12/23  Additional findings: No changes were made to patient's plan of care at Cardio visit 11/3/23         PLAN     Recommended plan for no diet, medication or health factor changes affecting INR     Dosing Instructions: hold dose then decrease your warfarin dose (11.1% change) with next INR in 7-10 days       Summary  As of 11/27/2023      Full warfarin instructions:  11/27: Hold; Otherwise 5 mg every Fri; 2.5 mg all other days   Next INR check:  12/6/2023               Telephone call with Loreta who agrees to plan and repeated back plan correctly    Lab visit scheduled    Education provided:   Please call back if any changes to your diet, medications or how you've been taking warfarin  Symptom monitoring: monitoring for bleeding signs and symptoms  Contact [phone]  with any changes, questions or concerns.     Plan made per [facility] anticoagulation protocol    Renee Martinez,  Subjective   Patient ID [id number]   Genna Garner is a 38 y.o.  at 35w5d with a working estimated date of delivery of 2023, by Last Menstrual Period who presents for a routine prenatal visit. She denies vaginal bleeding, leakage of fluid, decreased fetal movements, or contractions.    Her pregnancy is complicated by:  AMA    Objective   Physical Exam:   Weight: 78.7 kg (173 lb 6.4 oz)  Expected Total Weight Gain: Could not be calculated   Pregravid BMI: Could not be calculated  BP: 104/62  Fetal Heart Rate: 130 Fundal Height (cm): 35 cm Presentation: Vertex           Prenatal Labs  Urine Dip:  Lab Results   Component Value Date    KETONESU TRACE (A) 10/05/2023     Lab Results   Component Value Date    HGB 10.8 (L) 08/10/2023    HCT 33.3 (L) 08/10/2023    HEPBSAG NEGATIVE 2023         Imaging  The most recent ultrasound was wnl, good growth          Assessment/Plan   35w5d  Continue prenatal vitamin.  GBS taken.  Labor precautions  Follow up in 1 week for a routine prenatal visit.  Maya Johnson MD     RN  [facility]  11/27/2023    _______________________________________________________________________     Anticoagulation Episode Summary       Current INR goal:  2.0-3.0   TTR:  40.9% (1 y)   Target end date:  Indefinite   Send INR reminders to:  [facility]    Indications    Long term current use of anticoagulant therapy [Z79.01]  Cerebrovascular accident involving cerebellum (H) [I63.9]             Comments:               Anticoagulation Care Providers       Provider Role Specialty Phone number    Surya Dyer,  Referring Family Medicine [phone]

## 2023-12-11 ENCOUNTER — TELEPHONE (OUTPATIENT)
Dept: OBSTETRICS AND GYNECOLOGY | Facility: CLINIC | Age: 38
End: 2023-12-11
Payer: COMMERCIAL

## 2023-12-11 ENCOUNTER — OFFICE VISIT (OUTPATIENT)
Dept: OBSTETRICS AND GYNECOLOGY | Facility: CLINIC | Age: 38
End: 2023-12-11
Payer: COMMERCIAL

## 2023-12-11 VITALS
SYSTOLIC BLOOD PRESSURE: 118 MMHG | WEIGHT: 161 LBS | DIASTOLIC BLOOD PRESSURE: 74 MMHG | HEIGHT: 65 IN | BODY MASS INDEX: 26.82 KG/M2

## 2023-12-11 DIAGNOSIS — Z12.4 SCREENING FOR CERVICAL CANCER: ICD-10-CM

## 2023-12-11 DIAGNOSIS — Z11.51 SCREENING FOR HUMAN PAPILLOMAVIRUS: ICD-10-CM

## 2023-12-11 PROCEDURE — 99212 OFFICE O/P EST SF 10 MIN: CPT | Mod: 25 | Performed by: OBSTETRICS & GYNECOLOGY

## 2023-12-11 PROCEDURE — 99212 OFFICE O/P EST SF 10 MIN: CPT | Performed by: OBSTETRICS & GYNECOLOGY

## 2023-12-11 PROCEDURE — 1036F TOBACCO NON-USER: CPT | Performed by: OBSTETRICS & GYNECOLOGY

## 2023-12-11 PROCEDURE — 3078F DIAST BP <80 MM HG: CPT | Performed by: OBSTETRICS & GYNECOLOGY

## 2023-12-11 PROCEDURE — 87624 HPV HI-RISK TYP POOLED RSLT: CPT | Performed by: OBSTETRICS & GYNECOLOGY

## 2023-12-11 PROCEDURE — 88175 CYTOPATH C/V AUTO FLUID REDO: CPT | Mod: TC,GCY | Performed by: OBSTETRICS & GYNECOLOGY

## 2023-12-11 PROCEDURE — 3074F SYST BP LT 130 MM HG: CPT | Performed by: OBSTETRICS & GYNECOLOGY

## 2023-12-11 ASSESSMENT — ENCOUNTER SYMPTOMS
DEPRESSION: 0
LOSS OF SENSATION IN FEET: 0
OCCASIONAL FEELINGS OF UNSTEADINESS: 0

## 2023-12-11 ASSESSMENT — PAIN SCALES - GENERAL: PAINLEVEL: 0-NO PAIN

## 2023-12-11 ASSESSMENT — EDINBURGH POSTNATAL DEPRESSION SCALE (EPDS)
THE THOUGHT OF HARMING MYSELF HAS OCCURRED TO ME: NEVER
I HAVE BLAMED MYSELF UNNECESSARILY WHEN THINGS WENT WRONG: NO, NEVER
I HAVE BEEN SO UNHAPPY THAT I HAVE HAD DIFFICULTY SLEEPING: NOT AT ALL
I HAVE BEEN ANXIOUS OR WORRIED FOR NO GOOD REASON: NO, NOT AT ALL
THINGS HAVE BEEN GETTING ON TOP OF ME: NO, I HAVE BEEN COPING AS WELL AS EVER
I HAVE BEEN SO UNHAPPY THAT I HAVE BEEN CRYING: NO, NEVER
I HAVE LOOKED FORWARD WITH ENJOYMENT TO THINGS: AS MUCH AS I EVER DID
I HAVE BEEN ABLE TO LAUGH AND SEE THE FUNNY SIDE OF THINGS: AS MUCH AS I ALWAYS COULD
I HAVE FELT SCARED OR PANICKY FOR NO GOOD REASON: NO, NOT AT ALL
I HAVE FELT SAD OR MISERABLE: NO, NOT AT ALL
TOTAL SCORE: 0

## 2023-12-11 NOTE — TELEPHONE ENCOUNTER
Per CS- pt still unable to obtain birth certificate- requesting a letter to confirm birth of child.

## 2023-12-11 NOTE — PROGRESS NOTES
Subjective   Genna Garner is a 38 y.o. female who presents for a postpartum visit.  She is 6 week postpartum. I have fully reviewed the prenatal and intrapartum course.   Pt still having neurologic symptoms, had an episode of lower extremity paralysis about 1.5 wks postpartum with numbness/weakness in upper extremeties.  Seen at [facility] and told was due to a UTI but culture was neg.  No further workup done    The delivery was at term.  Type of delivery   emergent LTCS for seizure during labor  Complications -seizure, sent to [facility] postop  Place of delivery - [facility]  Postpartum course has been [facility] for some time, multiple imaging/eval.   Baby's course has been   NICU for 10 days, now doing well.   Baby is feeding by bottle -   .  Baby's name    Ronny  Bleeding no bleeding.  Patient is not sexually active.   Contraception method is vascectomy to be scheduled  Postpartum depression screening: negative.    Review of Systems     Objective   There were no vitals taken for this visit.   General:  Well developed, well nourished in NAD, alert and oriented    Breasts:  ideferred                Abdomen: Normal, incision well healed                 Perineum: normal         Pelvic support: normal    Vulva: normal   Vagina: normal vagina   Cervix:  no lesions, pap obtained   Corpus: normal   Adnexa:  normal adnexa   Rectal Exam: deferred     Assessment/Plan    Postpartum visit  Seizure in labor called eclampsia despite normal BP/labs/urine with neg workup but persistent neurologic symptoms  Recommend outside neuro consult to review prev workup and see pt  Follow up in: 1 year  or as needed for GYN care    Pap done  [facility] at age 41    Maya Johnson MD

## 2023-12-11 NOTE — LETTER
2023    Re:   1985  Gen[place] Garner  [address] [address]      To Whom it May Concern:    This letter is to certify Genna Garner was admitted to [facility] on 10/30/2023 and the subsequent  birth of son, Ronny Garner, born on 10/31/2023.     If you have any questions or concerns, please don't hesitate to call our office at [phone].      Sincerely,             Maya Johnson MD  [facility]  OB/GYN

## 2024-02-07 NOTE — H&P
History Of Present Illness  Genna Garner is a 38 y.o. female presenting with weakness and numbness to her legs and her feet along with hands and arms.  She states she had a baby 2 weeks ago had an emergency  due to hypotension and seizure-like activity.  She was down at [facility].  She had EEGs and MRIs which were all negative.  She came home and was doing fine until Friday when she started having weakness in her lower extremities and pain when walking on her heels.  Then she started having swelling and pain of her upper extremities.  She was eating and drinking okay until a couple of days ago.  Upon exam in the ER she has an elevated white blood count at 17.8.  She states that when she was in the hospital 2 weeks ago they did give her antibiotics thinking she had some form of infection but was sent home with no antibiotics.  She denies any incisional drainage she does have slight pain in the incision area.  She is having normal bowel movements.  When she tried to get up earlier in the ER she passed out.  Patient does have a history of depression does take medications.  She states she has been warm today feeling like she might have a fever.  She did have an epidural with the birth of her child but does not have any back pain at this time.     Past Medical History  She has a past medical history of Allergy status to unspecified drugs, medicaments and biological substances.    Surgical History  She has a past surgical history that includes Other surgical history (2019) and MR angio head wo IV contrast (2023).     Social History  She reports that she has never smoked. She has never used smokeless tobacco. She reports that she does not currently use alcohol. She reports that she does not use drugs.    Family History  No family history on file.     Allergies  Bee pollen, Cat dander, and Grass pollen    Review of Systems   Gen: POSITIVE fatigue, POSITIVE fever, sweats.  Head: No  headache, trauma.  Eyes: No vision loss, double vision, drainage, eye pain.  ENT: No hearing changes, pain, epistaxis, congestion  Cardiac: No chest pain  Pulmonary: No shortness of breath,  pleuritic pain,   Heme/lymph: No swollen glands  GI: No abdominal pain, nausea, vomiting, diarrhea  : No  dysuria, frequency, urgency, hematuria  Musculoskeletal: POSITIVE limb pain, no joint pain, back pain, joint swelling or stiffness.  Skin: POSITIVE abdominal incision  Neuro: POSITIVE Numbness, tingling, and weakness in lower and upper extremities  Psych: No  anxiety     Review of systems is otherwise negative unless stated above or in history of present illness.    Physical Exam   General: Vital signs stable, Pt is alert, mild acute distress, tearful  Eyes: Conjunctiva normal, PERRL, EOMs intact  HENMT: Normocephalic, atraumatic, external ears and nose normal, no scars or masses.  No mastoid tenderness. Trachea is midline. No meningeal signs, negative Kernig and Brudzinski, moves neck freely.  No sinus tenderness  Resp: Respiratory effort is normal, no retractions, no stridor. Lungs CTA, no wheezes or rhonchi  CV: Heart is regular rate and rhythm.   GI: No abdominal pain, active bowel sounds  Skin: POSITIVE suprapubic incision with no drainage or erythema,   Skel: POSITIVE limited range of motion of upper and lower extremities.   Neuro: ABNormal gait with weakness when getting up cannot bear weight on lower extremities, CN II-XII intact, no motor or sensory changes.  Psych: Alert and oriented ×3, judgment is appropriate, normal mood and affect      Last Recorded Vitals  /77   Pulse 89   Temp 37.2 °C (99 °F)   Resp 20   Wt 77.1 kg (170 lb)   SpO2 100%     Relevant Results      Results for orders placed or performed during the hospital encounter of 11/13/23 (from the past 24 hour(s))   CBC with Differential   Result Value Ref Range    WBC 17.8 (H) 4.4 - 11.3 x10*3/uL    nRBC 0.0 0.0 - 0.0 /100 WBCs    RBC 2.96  (L) 4.00 - 5.20 x10*6/uL    Hemoglobin 9.4 (L) 12.0 - 16.0 g/dL    Hematocrit 28.8 (L) 36.0 - 46.0 %    MCV 97 80 - 100 fL    MCH 31.8 26.0 - 34.0 pg    MCHC 32.6 32.0 - 36.0 g/dL    RDW 13.1 11.5 - 14.5 %    Platelets 444 150 - 450 x10*3/uL    Neutrophils % 84.5 40.0 - 80.0 %    Immature Granulocytes %, Automated 0.5 0.0 - 0.9 %    Lymphocytes % 8.9 13.0 - 44.0 %    Monocytes % 3.9 2.0 - 10.0 %    Eosinophils % 2.0 0.0 - 6.0 %    Basophils % 0.2 0.0 - 2.0 %    Neutrophils Absolute 15.02 (H) 1.20 - 7.70 x10*3/uL    Immature Granulocytes Absolute, Automated 0.08 0.00 - 0.70 x10*3/uL    Lymphocytes Absolute 1.58 1.20 - 4.80 x10*3/uL    Monocytes Absolute 0.69 0.10 - 1.00 x10*3/uL    Eosinophils Absolute 0.36 0.00 - 0.70 x10*3/uL    Basophils Absolute 0.04 0.00 - 0.10 x10*3/uL   Comprehensive Metabolic Panel   Result Value Ref Range    Glucose 131 (H) 74 - 99 mg/dL    Sodium 136 136 - 145 mmol/L    Potassium 3.5 3.5 - 5.3 mmol/L    Chloride 104 98 - 107 mmol/L    Bicarbonate 20 (L) 21 - 32 mmol/L    Anion Gap 16 10 - 20 mmol/L    Urea Nitrogen 15 6 - 23 mg/dL    Creatinine 0.70 0.50 - 1.05 mg/dL    eGFR >90 >60 mL/min/1.73m*2    Calcium 8.7 8.6 - 10.3 mg/dL    Albumin 3.5 3.4 - 5.0 g/dL    Alkaline Phosphatase 54 33 - 110 U/L    Total Protein 6.6 6.4 - 8.2 g/dL    AST 13 9 - 39 U/L    Bilirubin, Total 0.4 0.0 - 1.2 mg/dL    ALT 12 7 - 45 U/L   B-type natriuretic peptide   Result Value Ref Range    BNP 33 0 - 99 pg/mL   Cardiac Enzymes - CPK   Result Value Ref Range    Creatine Kinase 34 0 - 215 U/L   Magnesium   Result Value Ref Range    Magnesium 1.70 1.60 - 2.40 mg/dL   Troponin I, High Sensitivity   Result Value Ref Range    Troponin I, High Sensitivity <3 0 - 13 ng/L   Troponin I, High Sensitivity, Initial   Result Value Ref Range    Troponin I, High Sensitivity <3 0 - 13 ng/L   C-reactive protein   Result Value Ref Range    C-Reactive Protein 9.23 (H) <1.00 mg/dL   POCT GLUCOSE   Result Value Ref Range    POCT  Glucose 102 (H) 74 - 99 mg/dL   Sedimentation rate, automated   Result Value Ref Range    Sedimentation Rate 83 (H) 0 - 20 mm/h        Assessment/Plan   Principal Problem:    Leukocytosis  -CBC wbc 17.8  -CMP   -Sed rate 83  -CRP 9.23  -Trop <3  -Mag 1.70  -CK 34  -BNP 33  -Blood culture x2  -Covid test pending  -Flu test pending  -Ua pending  -urine culture  -zosyn 3.375 gm ivpb q6h  -vancomycin ivpb q 24 hrs pharmacy to calculate  -CT abd/pelvis pending  -ID consult  -IV normal saline 125 hr    DVT prophylaxis  -ambulate    Labs/Testing reviewed    COVID pending  Flu pending  CT abdomen and pelvis pending  ID consult pending  Start antibiotics and IV fluids    * 45 minutes total spent on patient's care today; >50% time spent on counseling/coordination of care           Ana Finn, APRN-CNP     Strong peripheral pulses